# Patient Record
Sex: MALE | Race: WHITE | URBAN - METROPOLITAN AREA
[De-identification: names, ages, dates, MRNs, and addresses within clinical notes are randomized per-mention and may not be internally consistent; named-entity substitution may affect disease eponyms.]

---

## 2017-01-10 ENCOUNTER — DOCTOR'S OFFICE (OUTPATIENT)
Dept: URBAN - METROPOLITAN AREA CLINIC 125 | Facility: CLINIC | Age: 67
Setting detail: OPHTHALMOLOGY
End: 2017-01-10
Payer: COMMERCIAL

## 2017-01-10 DIAGNOSIS — H43.811: ICD-10-CM

## 2017-01-10 PROCEDURE — 92012 INTRM OPH EXAM EST PATIENT: CPT | Performed by: OPTOMETRIST

## 2017-01-10 ASSESSMENT — REFRACTION_CURRENTRX
OS_CYLINDER: -0.25
OD_OVR_VA: 20/
OS_ADD: +2.00
OD_SPHERE: -1.50
OS_SPHERE: -1.75
OS_OVR_VA: 20/
OD_OVR_VA: 20/
OD_AXIS: 042
OD_CYLINDER: -0.75
OD_ADD: +2.00
OS_OVR_VA: 20/
OS_OVR_VA: 20/
OS_AXIS: 175
OD_OVR_VA: 20/

## 2017-01-10 ASSESSMENT — REFRACTION_MANIFEST
OS_VA3: 20/
OD_VA2: 20/
OS_VA2: 20/
OD_VA1: 20/
OU_VA: 20/
OS_VA2: 20/
OS_VA1: 20/
OS_VA1: 20/
OD_VA3: 20/
OD_VA2: 20/
OD_VA3: 20/
OD_VA1: 20/
OS_VA3: 20/
OU_VA: 20/

## 2017-01-10 ASSESSMENT — REFRACTION_OUTSIDERX
OS_ADD: +2.50
OD_VA1: 20/30
OU_VA: 20/
OD_ADD: +2.50
OD_AXIS: 060
OD_CYLINDER: -0.75
OD_VA3: 20/
OD_SPHERE: -1.25
OS_CYLINDER: SPH
OS_VA1: 20/30
OS_SPHERE: -1.75
OS_VA3: 20/
OD_VA2: 20/20 OU
OS_VA2: 20/

## 2017-01-10 ASSESSMENT — REFRACTION_AUTOREFRACTION
OS_SPHERE: -2.00
OD_SPHERE: -1.00
OD_CYLINDER: -0.75
OS_CYLINDER: -0.50
OS_AXIS: 150
OD_AXIS: 046

## 2017-01-10 ASSESSMENT — CONFRONTATIONAL VISUAL FIELD TEST (CVF)
OS_FINDINGS: FULL
OD_FINDINGS: FULL

## 2017-01-10 ASSESSMENT — SPHEQUIV_DERIVED
OD_SPHEQUIV: -1.375
OS_SPHEQUIV: -2.25

## 2017-01-10 ASSESSMENT — VISUAL ACUITY
OD_BCVA: 20/20
OS_BCVA: 20/25

## 2017-01-11 ENCOUNTER — DOCTOR'S OFFICE (OUTPATIENT)
Dept: URBAN - METROPOLITAN AREA CLINIC 125 | Facility: CLINIC | Age: 67
Setting detail: OPHTHALMOLOGY
End: 2017-01-11
Payer: COMMERCIAL

## 2017-01-11 DIAGNOSIS — H04.121: ICD-10-CM

## 2017-01-11 DIAGNOSIS — H43.813: ICD-10-CM

## 2017-01-11 DIAGNOSIS — H43.812: ICD-10-CM

## 2017-01-11 DIAGNOSIS — H04.122: ICD-10-CM

## 2017-01-11 DIAGNOSIS — H25.13: ICD-10-CM

## 2017-01-11 PROCEDURE — 99214 OFFICE O/P EST MOD 30 MIN: CPT | Performed by: OPHTHALMOLOGY

## 2017-01-11 PROCEDURE — 92225 OPHTHALMOSCOPY EXTENDED INITIAL: CPT | Performed by: OPHTHALMOLOGY

## 2017-01-11 ASSESSMENT — REFRACTION_OUTSIDERX
OD_CYLINDER: -0.75
OD_VA1: 20/30
OS_VA1: 20/30
OU_VA: 20/
OS_ADD: +2.50
OS_CYLINDER: SPH
OD_ADD: +2.50
OD_VA3: 20/
OD_SPHERE: -1.25
OD_VA2: 20/20 OU
OS_SPHERE: -1.75
OD_AXIS: 060
OS_VA2: 20/
OS_VA3: 20/

## 2017-01-11 ASSESSMENT — REFRACTION_CURRENTRX
OS_OVR_VA: 20/
OD_CYLINDER: -0.75
OS_OVR_VA: 20/
OD_ADD: +2.00
OD_OVR_VA: 20/
OS_ADD: +2.00
OD_OVR_VA: 20/
OS_OVR_VA: 20/
OD_SPHERE: -1.50
OS_AXIS: 175
OS_CYLINDER: -0.25
OS_SPHERE: -1.75
OD_OVR_VA: 20/
OD_AXIS: 042

## 2017-01-11 ASSESSMENT — REFRACTION_MANIFEST
OD_VA2: 20/
OD_VA3: 20/
OS_VA1: 20/
OD_VA1: 20/
OS_VA2: 20/
OS_VA3: 20/
OS_VA1: 20/
OD_VA2: 20/
OD_VA3: 20/
OD_VA1: 20/
OU_VA: 20/
OS_VA2: 20/
OU_VA: 20/
OS_VA3: 20/

## 2017-01-11 ASSESSMENT — VISUAL ACUITY
OS_BCVA: 20/25-2
OD_BCVA: 20/25-1

## 2017-01-11 ASSESSMENT — REFRACTION_AUTOREFRACTION
OD_SPHERE: -1.00
OS_SPHERE: -2.00
OD_CYLINDER: -0.75
OS_CYLINDER: -0.50
OD_AXIS: 046
OS_AXIS: 150

## 2017-01-11 ASSESSMENT — CONFRONTATIONAL VISUAL FIELD TEST (CVF)
OD_FINDINGS: FULL
OS_FINDINGS: FULL

## 2017-01-11 ASSESSMENT — SPHEQUIV_DERIVED
OS_SPHEQUIV: -2.25
OD_SPHEQUIV: -1.375

## 2017-09-14 ENCOUNTER — DOCTOR'S OFFICE (OUTPATIENT)
Dept: URBAN - METROPOLITAN AREA CLINIC 125 | Facility: CLINIC | Age: 67
Setting detail: OPHTHALMOLOGY
End: 2017-09-14
Payer: COMMERCIAL

## 2017-09-14 DIAGNOSIS — H52.13: ICD-10-CM

## 2017-09-14 DIAGNOSIS — H52.4: ICD-10-CM

## 2017-09-14 PROBLEM — H43.811 POSTERIOR VITREOUS DETACHMENT; RIGHT EYE: Status: ACTIVE | Noted: 2017-01-11

## 2017-09-14 PROBLEM — H04.121 DRY EYE; RIGHT EYE, LEFT EYE: Status: ACTIVE | Noted: 2017-01-11

## 2017-09-14 PROBLEM — H04.122 DRY EYE; RIGHT EYE, LEFT EYE: Status: ACTIVE | Noted: 2017-01-11

## 2017-09-14 PROBLEM — H43.812 POSTERIOR VITREOUS DETACHMENT; RIGHT EYE: Status: ACTIVE | Noted: 2017-01-11

## 2017-09-14 PROBLEM — H25.13 CATARACT NUCLEAR SCLEROSIS AGE RELATED; BOTH EYES: Status: ACTIVE | Noted: 2017-01-11

## 2017-09-14 PROCEDURE — 92012 INTRM OPH EXAM EST PATIENT: CPT | Performed by: OPTOMETRIST

## 2017-09-14 PROCEDURE — 92015 DETERMINE REFRACTIVE STATE: CPT | Performed by: OPTOMETRIST

## 2017-09-14 PROCEDURE — 92310 CONTACT LENS FITTING OU: CPT | Performed by: OPTOMETRIST

## 2017-09-14 ASSESSMENT — KERATOMETRY
OD_K2POWER_DIOPTERS: 44.00
OD_K1POWER_DIOPTERS: 43.50
OS_K1POWER_DIOPTERS: 43.50
OD_AXISANGLE_DEGREES: 040
OS_K2POWER_DIOPTERS: 43.75
OS_AXISANGLE_DEGREES: 003

## 2017-09-14 ASSESSMENT — REFRACTION_AUTOREFRACTION
OD_CYLINDER: -0.50
OS_CYLINDER: -0.75
OS_SPHERE: -2.00
OD_SPHERE: -1.25
OD_AXIS: 027
OS_AXIS: 007

## 2017-09-14 ASSESSMENT — REFRACTION_MANIFEST
OD_VA1: 20/
OS_VA3: 20/
OD_VA2: 20/
OS_VA3: 20/
OS_VA2: 20/
OS_VA1: 20/
OD_VA3: 20/
OS_VA1: 20/
OD_VA3: 20/
OD_VA2: 20/
OU_VA: 20/
OU_VA: 20/
OD_VA1: 20/
OS_VA2: 20/

## 2017-09-14 ASSESSMENT — REFRACTION_OUTSIDERX
OD_VA3: 20/
OD_SPHERE: -1.25
OU_VA: 20/
OS_ADD: +2.50
OS_SPHERE: -1.75
OS_VA1: 20/30
OS_VA3: 20/
OS_CYLINDER: SPH
OS_VA2: 20/
OD_ADD: +2.50
OD_CYLINDER: SPH
OD_VA2: 20/20 OU
OD_VA1: 20/30

## 2017-09-14 ASSESSMENT — AXIALLENGTH_DERIVED
OS_AL: 24.5054
OD_AL: 24.095

## 2017-09-14 ASSESSMENT — SPHEQUIV_DERIVED
OD_SPHEQUIV: -1.5
OS_SPHEQUIV: -2.375

## 2017-09-14 ASSESSMENT — REFRACTION_CURRENTRX
OD_VPRISM_DIRECTION: PROGS
OD_SPHERE: -1.75
OD_OVR_VA: 20/
OS_VPRISM_DIRECTION: PROGS
OS_ADD: +2.00
OD_AXIS: 033
OS_OVR_VA: 20/
OS_CYLINDER: -0.25
OD_ADD: +2.00
OD_OVR_VA: 20/
OD_OVR_VA: 20/
OS_AXIS: 172
OD_CYLINDER: -0.75
OS_SPHERE: -1.75

## 2017-09-14 ASSESSMENT — VISUAL ACUITY
OD_BCVA: 20/40
OS_BCVA: 20/40+1

## 2017-09-20 ENCOUNTER — OPTICAL OFFICE (OUTPATIENT)
Dept: URBAN - METROPOLITAN AREA CLINIC 134 | Facility: CLINIC | Age: 67
Setting detail: OPHTHALMOLOGY
End: 2017-09-20
Payer: COMMERCIAL

## 2017-09-20 DIAGNOSIS — H52.13: ICD-10-CM

## 2017-09-20 PROCEDURE — S0500 DISPOS CONT LENS: HCPCS | Performed by: OPTOMETRIST

## 2024-09-16 ENCOUNTER — TRANSCRIBE ORDERS (OUTPATIENT)
Dept: REGISTRATION | Facility: HOSPITAL | Age: 74
End: 2024-09-16

## 2024-09-16 ENCOUNTER — APPOINTMENT (OUTPATIENT)
Dept: LAB | Facility: HOSPITAL | Age: 74
End: 2024-09-16
Attending: ORTHOPAEDIC SURGERY
Payer: MEDICARE

## 2024-09-16 DIAGNOSIS — Z01.812 ENCOUNTER FOR PREPROCEDURAL LABORATORY EXAMINATION: Primary | ICD-10-CM

## 2024-09-16 DIAGNOSIS — Z01.812 ENCOUNTER FOR PREPROCEDURAL LABORATORY EXAMINATION: ICD-10-CM

## 2024-09-16 LAB
ABO + RH BLD: NORMAL
ANION GAP SERPL CALC-SCNC: 7 MEQ/L (ref 3–15)
APTT PPP: 25 SEC (ref 23–35)
BASOPHILS # BLD: 0.03 K/UL (ref 0.01–0.1)
BASOPHILS NFR BLD: 0.6 %
BLD GP AB SCN SERPL QL: NEGATIVE
BLOOD BANK CMNT PATIENT-IMP: NORMAL
BUN SERPL-MCNC: 29 MG/DL (ref 7–25)
CALCIUM SERPL-MCNC: 9.8 MG/DL (ref 8.6–10.3)
CHLORIDE SERPL-SCNC: 105 MEQ/L (ref 98–107)
CO2 SERPL-SCNC: 28 MEQ/L (ref 21–31)
CREAT SERPL-MCNC: 1.8 MG/DL (ref 0.7–1.3)
D AG BLD QL: POSITIVE
DIFFERENTIAL METHOD BLD: ABNORMAL
EGFRCR SERPLBLD CKD-EPI 2021: 38.8 ML/MIN/1.73M*2
EOSINOPHIL # BLD: 0.19 K/UL (ref 0.04–0.54)
EOSINOPHIL NFR BLD: 3.5 %
ERYTHROCYTE [DISTWIDTH] IN BLOOD BY AUTOMATED COUNT: 12.2 % (ref 11.6–14.4)
EST. AVERAGE GLUCOSE BLD GHB EST-MCNC: 128 MG/DL
GLUCOSE SERPL-MCNC: 185 MG/DL (ref 70–99)
HBA1C MFR BLD: 6.1 %
HCT VFR BLD AUTO: 34.4 % (ref 40.1–51)
HGB BLD-MCNC: 11.7 G/DL (ref 13.7–17.5)
IMM GRANULOCYTES # BLD AUTO: 0.01 K/UL (ref 0–0.08)
IMM GRANULOCYTES NFR BLD AUTO: 0.2 %
INR PPP: 1.2
LABORATORY COMMENT REPORT: NORMAL
LYMPHOCYTES # BLD: 0.95 K/UL (ref 1.2–3.5)
LYMPHOCYTES NFR BLD: 17.5 %
MCH RBC QN AUTO: 30.9 PG (ref 28–33.2)
MCHC RBC AUTO-ENTMCNC: 34 G/DL (ref 32.2–36.5)
MCV RBC AUTO: 90.8 FL (ref 83–98)
MONOCYTES # BLD: 0.43 K/UL (ref 0.3–1)
MONOCYTES NFR BLD: 7.9 %
NEUTROPHILS # BLD: 3.83 K/UL (ref 1.7–7)
NEUTS SEG NFR BLD: 70.3 %
NRBC BLD-RTO: 0 %
PDW BLD AUTO: 9.7 FL (ref 9.4–12.4)
PLAT MORPH BLD: NORMAL
PLATELET # BLD AUTO: 143 K/UL (ref 150–350)
PLATELET # BLD EST: ABNORMAL 10*3/UL
POTASSIUM SERPL-SCNC: 4.3 MEQ/L (ref 3.5–5.1)
PROTHROMBIN TIME: 14.9 SEC (ref 12.2–14.5)
RBC # BLD AUTO: 3.79 M/UL (ref 4.5–5.8)
RBC MORPH BLD: NORMAL
SODIUM SERPL-SCNC: 140 MEQ/L (ref 136–145)
SPECIMEN EXP DATE BLD: NORMAL
WBC # BLD AUTO: 5.44 K/UL (ref 3.8–10.5)

## 2024-09-16 PROCEDURE — 85025 COMPLETE CBC W/AUTO DIFF WBC: CPT

## 2024-09-16 PROCEDURE — 86850 RBC ANTIBODY SCREEN: CPT

## 2024-09-16 PROCEDURE — 86901 BLOOD TYPING SEROLOGIC RH(D): CPT

## 2024-09-16 PROCEDURE — 85610 PROTHROMBIN TIME: CPT

## 2024-09-16 PROCEDURE — 36415 COLL VENOUS BLD VENIPUNCTURE: CPT

## 2024-09-16 PROCEDURE — 80048 BASIC METABOLIC PNL TOTAL CA: CPT

## 2024-09-16 PROCEDURE — 85730 THROMBOPLASTIN TIME PARTIAL: CPT | Mod: GZ

## 2024-09-16 PROCEDURE — 83036 HEMOGLOBIN GLYCOSYLATED A1C: CPT | Mod: GZ

## 2024-10-02 ENCOUNTER — PRE-ADMISSION TESTING (OUTPATIENT)
Dept: PREADMISSION TESTING | Age: 74
End: 2024-10-02
Payer: MEDICARE

## 2024-10-02 VITALS — HEIGHT: 73 IN | WEIGHT: 214 LBS | BODY MASS INDEX: 28.36 KG/M2

## 2024-10-02 RX ORDER — ISOSORBIDE MONONITRATE 30 MG/1
30 TABLET, EXTENDED RELEASE ORAL DAILY
COMMUNITY

## 2024-10-02 RX ORDER — MIRTAZAPINE 15 MG/1
15 TABLET, FILM COATED ORAL NIGHTLY
COMMUNITY

## 2024-10-02 RX ORDER — CARVEDILOL 3.12 MG/1
3.12 TABLET ORAL 2 TIMES DAILY WITH MEALS
COMMUNITY

## 2024-10-02 RX ORDER — ATORVASTATIN CALCIUM 40 MG/1
40 TABLET, FILM COATED ORAL DAILY
COMMUNITY

## 2024-10-02 RX ORDER — TAMSULOSIN HYDROCHLORIDE 0.4 MG/1
0.4 CAPSULE ORAL NIGHTLY
COMMUNITY

## 2024-10-02 RX ORDER — HYDRALAZINE HYDROCHLORIDE 50 MG/1
75 TABLET, FILM COATED ORAL 3 TIMES DAILY
COMMUNITY

## 2024-10-02 RX ORDER — ASPIRIN 81 MG/1
81 TABLET ORAL DAILY
COMMUNITY

## 2024-10-02 RX ORDER — LOSARTAN POTASSIUM 50 MG/1
50 TABLET ORAL DAILY
COMMUNITY

## 2024-10-02 RX ORDER — AMLODIPINE BESYLATE 5 MG/1
5 TABLET ORAL DAILY
COMMUNITY

## 2024-10-02 NOTE — PRE-PROCEDURE INSTRUCTIONS
[address] [address]    1.       You will be called between 1pm-5pm one business day before your procedure to tell you where and when to report. If you do not receive a phone call by 6pm, please call the Operating Room at [phone].    2.        Please report to Surgery Registration on the day of your procedure. You can park in the east garage, enter the front doors of the new Pavilion, and take the [place] elevators to the second floor. Follow signs to Surgery Registration.       3. Please follow the following fasting guidelines: NPO after midnight as per Dr. Lucas's instructions     No solid food EIGHT HOURS prior to arrival time on day of surgery.  6 ounces of clear liquids, meaning water or PLAIN black coffee WITHOUT any milk, cream, sugar, or sweetener are permitted up to TWO HOURS prior to arrival at the hospital.    4. Please take ONLY the following medications with a sip of water on the morning of your procedure: (populate names and/or NONE)  coreg, apresoline, isosorbide             No NSAIDs, Supplements, Vitamins from 10/2/24              May take Tylenol if needed               Jardiance 10/3/24       5. Other Instructions: You may brush your teeth the morning of the procedure. Rinse and spit, do not swallow.  Bring a list of your medications with dosages.  Use surgical wash as directed. Surgical Scrub the night before and the morning of the procedure    6. If you develop a cold, cough, fever, rash, or other symptom prior to the day of the procedure, please report it to your physician immediately.    7. If you need to cancel the procedure for any reason, please contact your physician.    8. Make arrangements to have safe transportation home accompanied by a responsible adult. If you have not arranged safe transportation home, your surgery will be cancelled. Safe transportation may include private vehicle, ride-share service, taxi and public transportation when  accompanied by a responsible adult who will assist you home. A responsible adult is someone known to you and does not include the taxi, ride-share or public transit drive transporting you.    9.  If it is medically necessary for you to have a longer stay, you will be informed as soon as the decision is made.    10. Only bring essential items to the hospital.  Do not wear or bring anything of value to the hospital including jewelry of any kind, money, or wallet. Do not wear make-up or contact lenses.  DO NOT BRING MEDICATIONS FROM HOME unless instructed to do so. DO bring your hearing aids, glasses, and a case    11. No lotion, creams, powders, or oils on skin the morning of procedure     12. Dress in comfortable clothes.    13.  If instructed, please bring a copy of your Advanced Directive (Living Will/Durable Power of ) on the day of your procedure.     14. Ensuring your safety at all times is a very important part of our [facility] Culture of Safety. After having surgery and sedation, you are at risk for falling and balance issues. Although you may feel awake, the effects of the medication can last up to 24 hours after anesthesia. If you need to use the bathroom during your recovery period, nursing staff will escort you there and stay with you to ensure your safety.    15. Refrain from drinking alcohol and smoking cigarettes for 24 hours prior to surgery.    16. Shower with antibacterial soap (Dial) the night before and morning of your procedure.  If your procedure indicates the need for CHG antiseptic wash (Bactoshield or Hibiclens), please use this instead and follow instructions as discussed at the time of your Pre-Admission Testing phone interview or visit.    Above instructions reviewed with patient and patient acknowledges understanding.    Form explained by: Dana Pedersen RN

## 2024-10-07 ENCOUNTER — ANESTHESIA EVENT (OUTPATIENT)
Dept: OPERATING ROOM | Facility: HOSPITAL | Age: 74
Setting detail: SURGERY ADMIT
DRG: 472 | End: 2024-10-07
Payer: MEDICARE

## 2024-10-07 ENCOUNTER — APPOINTMENT (INPATIENT)
Dept: RADIOLOGY | Facility: HOSPITAL | Age: 74
DRG: 472 | End: 2024-10-07
Attending: NURSE PRACTITIONER
Payer: MEDICARE

## 2024-10-07 ENCOUNTER — APPOINTMENT (OUTPATIENT)
Dept: RADIOLOGY | Facility: HOSPITAL | Age: 74
Setting detail: SURGERY ADMIT
DRG: 472 | End: 2024-10-07
Attending: ORTHOPAEDIC SURGERY
Payer: MEDICARE

## 2024-10-07 ENCOUNTER — HOSPITAL ENCOUNTER (INPATIENT)
Facility: HOSPITAL | Age: 74
LOS: 4 days | Discharge: HOME | DRG: 472 | End: 2024-10-11
Attending: ORTHOPAEDIC SURGERY | Admitting: ORTHOPAEDIC SURGERY
Payer: MEDICARE

## 2024-10-07 DIAGNOSIS — I48.0 PAROXYSMAL ATRIAL FIBRILLATION (CMS/HCC): Primary | ICD-10-CM

## 2024-10-07 DIAGNOSIS — N17.9 AKI (ACUTE KIDNEY INJURY) (CMS/HCC): ICD-10-CM

## 2024-10-07 DIAGNOSIS — M79.2 CERVICAL SPINE ARTHRITIS WITH NERVE PAIN: ICD-10-CM

## 2024-10-07 DIAGNOSIS — M47.812 CERVICAL SPINE ARTHRITIS WITH NERVE PAIN: ICD-10-CM

## 2024-10-07 PROBLEM — I25.10 CAD (CORONARY ARTERY DISEASE): Status: ACTIVE | Noted: 2024-10-07

## 2024-10-07 PROBLEM — Z86.79 HISTORY OF ISCHEMIC CARDIOMYOPATHY: Status: ACTIVE | Noted: 2024-10-07

## 2024-10-07 PROBLEM — I10 ESSENTIAL HYPERTENSION: Status: ACTIVE | Noted: 2024-10-07

## 2024-10-07 PROBLEM — G62.9 NEUROPATHY: Status: ACTIVE | Noted: 2024-10-07

## 2024-10-07 PROBLEM — E11.9 DIABETES MELLITUS TYPE II, NON INSULIN DEPENDENT (CMS/HCC): Status: ACTIVE | Noted: 2024-10-07

## 2024-10-07 PROBLEM — I71.40 AAA (ABDOMINAL AORTIC ANEURYSM) (CMS/HCC): Status: ACTIVE | Noted: 2024-10-07

## 2024-10-07 LAB
GLUCOSE BLD-MCNC: 140 MG/DL (ref 70–99)
GLUCOSE BLD-MCNC: 155 MG/DL (ref 70–99)
POCT TEST: ABNORMAL
POCT TEST: ABNORMAL

## 2024-10-07 PROCEDURE — 25000000 HC PHARMACY GENERAL: Performed by: NURSE PRACTITIONER

## 2024-10-07 PROCEDURE — 63600000 HC DRUGS/DETAIL CODE: Mod: JZ | Performed by: STUDENT IN AN ORGANIZED HEALTH CARE EDUCATION/TRAINING PROGRAM

## 2024-10-07 PROCEDURE — 63600000 HC DRUGS/DETAIL CODE: Mod: JZ

## 2024-10-07 PROCEDURE — C1713 ANCHOR/SCREW BN/BN,TIS/BN: HCPCS | Performed by: ORTHOPAEDIC SURGERY

## 2024-10-07 PROCEDURE — 71000001 HC PACU PHASE 1 INITIAL 30MIN: Performed by: ORTHOPAEDIC SURGERY

## 2024-10-07 PROCEDURE — 37000001 HC ANESTHESIA GENERAL: Performed by: ORTHOPAEDIC SURGERY

## 2024-10-07 PROCEDURE — 63600000 HC DRUGS/DETAIL CODE: Mod: JZ | Performed by: NURSE PRACTITIONER

## 2024-10-07 PROCEDURE — 00NW0ZZ RELEASE CERVICAL SPINAL CORD, OPEN APPROACH: ICD-10-PCS | Performed by: ORTHOPAEDIC SURGERY

## 2024-10-07 PROCEDURE — 20600000 HC ROOM AND CARE INTERMEDIATE/TELEMETRY

## 2024-10-07 PROCEDURE — 63700000 HC SELF-ADMINISTRABLE DRUG: Performed by: NURSE PRACTITIONER

## 2024-10-07 PROCEDURE — 72020 X-RAY EXAM OF SPINE 1 VIEW: CPT

## 2024-10-07 PROCEDURE — 99232 SBSQ HOSP IP/OBS MODERATE 35: CPT | Mod: FS | Performed by: INTERNAL MEDICINE

## 2024-10-07 PROCEDURE — 86891 AUTOLOGOUS BLOOD OP SALVAGE: CPT

## 2024-10-07 PROCEDURE — 25800000 HC PHARMACY IV SOLUTIONS: Performed by: NURSE PRACTITIONER

## 2024-10-07 PROCEDURE — 25000000 HC PHARMACY GENERAL: Mod: JW | Performed by: NURSE ANESTHETIST, CERTIFIED REGISTERED

## 2024-10-07 PROCEDURE — 27200000 HC STERILE SUPPLY: Performed by: ORTHOPAEDIC SURGERY

## 2024-10-07 PROCEDURE — 63600000 HC DRUGS/DETAIL CODE: Mod: JW | Performed by: NURSE PRACTITIONER

## 2024-10-07 PROCEDURE — 36000004 HC OR LEVEL 4 INITIAL 30MIN: Performed by: ORTHOPAEDIC SURGERY

## 2024-10-07 PROCEDURE — 27800000 HC SUPPLY/IMPLANTS: Performed by: ORTHOPAEDIC SURGERY

## 2024-10-07 PROCEDURE — 71000011 HC PACU PHASE 1 EA ADDL MIN: Performed by: ORTHOPAEDIC SURGERY

## 2024-10-07 PROCEDURE — 36000014 HC OR LEVEL 4 EA ADDL MIN: Performed by: ORTHOPAEDIC SURGERY

## 2024-10-07 PROCEDURE — 25000000 HC PHARMACY GENERAL: Performed by: ORTHOPAEDIC SURGERY

## 2024-10-07 PROCEDURE — 0RG2071 FUSION OF 2 OR MORE CERVICAL VERTEBRAL JOINTS WITH AUTOLOGOUS TISSUE SUBSTITUTE, POSTERIOR APPROACH, POSTERIOR COLUMN, OPEN APPROACH: ICD-10-PCS | Performed by: ORTHOPAEDIC SURGERY

## 2024-10-07 PROCEDURE — 4A10X4G MONITORING OF CENTRAL NERVOUS ELECTRICAL ACTIVITY, INTRAOPERATIVE, EXTERNAL APPROACH: ICD-10-PCS | Performed by: ORTHOPAEDIC SURGERY

## 2024-10-07 PROCEDURE — 63600000 HC DRUGS/DETAIL CODE: Mod: JZ | Performed by: ORTHOPAEDIC SURGERY

## 2024-10-07 PROCEDURE — 63600000 HC DRUGS/DETAIL CODE: Mod: JW | Performed by: NURSE ANESTHETIST, CERTIFIED REGISTERED

## 2024-10-07 DEVICE — IMPLANTABLE DEVICE: Type: IMPLANTABLE DEVICE | Status: FUNCTIONAL

## 2024-10-07 DEVICE — 4.0 PLY SCREW 3.5X12: Type: IMPLANTABLE DEVICE | Status: FUNCTIONAL

## 2024-10-07 DEVICE — 4.0 PLY SCREW 3.5X20: Type: IMPLANTABLE DEVICE | Status: FUNCTIONAL

## 2024-10-07 DEVICE — SCREW 4.0 PLY 3.5X10: Type: IMPLANTABLE DEVICE | Status: FUNCTIONAL

## 2024-10-07 DEVICE — 3 DEMIN STRIP 50 X 10CM: Type: IMPLANTABLE DEVICE | Status: FUNCTIONAL

## 2024-10-07 DEVICE — IMPLANTABLE DEVICE: Type: IMPLANTABLE DEVICE | Site: NECK | Status: FUNCTIONAL

## 2024-10-07 DEVICE — SET SCREW: Type: IMPLANTABLE DEVICE | Status: FUNCTIONAL

## 2024-10-07 RX ORDER — HYDROMORPHONE HYDROCHLORIDE 1 MG/ML
INJECTION, SOLUTION INTRAMUSCULAR; INTRAVENOUS; SUBCUTANEOUS
Status: COMPLETED
Start: 2024-10-07 | End: 2024-10-07

## 2024-10-07 RX ORDER — TAMSULOSIN HYDROCHLORIDE 0.4 MG/1
0.4 CAPSULE ORAL NIGHTLY
Status: DISCONTINUED | OUTPATIENT
Start: 2024-10-07 | End: 2024-10-11 | Stop reason: HOSPADM

## 2024-10-07 RX ORDER — HYDRALAZINE HYDROCHLORIDE 20 MG/ML
5 INJECTION INTRAMUSCULAR; INTRAVENOUS
Status: DISCONTINUED | OUTPATIENT
Start: 2024-10-07 | End: 2024-10-07 | Stop reason: HOSPADM

## 2024-10-07 RX ORDER — MORPHINE SULFATE 2 MG/ML
1 INJECTION, SOLUTION INTRAMUSCULAR; INTRAVENOUS EVERY 4 HOURS PRN
Status: DISPENSED | OUTPATIENT
Start: 2024-10-07 | End: 2024-10-08

## 2024-10-07 RX ORDER — FENTANYL CITRATE 50 UG/ML
50 INJECTION, SOLUTION INTRAMUSCULAR; INTRAVENOUS EVERY 5 MIN PRN
Status: DISCONTINUED | OUTPATIENT
Start: 2024-10-07 | End: 2024-10-07 | Stop reason: HOSPADM

## 2024-10-07 RX ORDER — OXYCODONE HYDROCHLORIDE 5 MG/1
5-10 TABLET ORAL EVERY 4 HOURS PRN
Status: DISCONTINUED | OUTPATIENT
Start: 2024-10-07 | End: 2024-10-11 | Stop reason: HOSPADM

## 2024-10-07 RX ORDER — ISOSORBIDE MONONITRATE 30 MG/1
30 TABLET, EXTENDED RELEASE ORAL DAILY
Status: DISCONTINUED | OUTPATIENT
Start: 2024-10-08 | End: 2024-10-11 | Stop reason: HOSPADM

## 2024-10-07 RX ORDER — ONDANSETRON HYDROCHLORIDE 2 MG/ML
4 INJECTION, SOLUTION INTRAVENOUS
Status: DISCONTINUED | OUTPATIENT
Start: 2024-10-07 | End: 2024-10-07 | Stop reason: HOSPADM

## 2024-10-07 RX ORDER — POLYETHYLENE GLYCOL 3350 17 G/17G
17 POWDER, FOR SOLUTION ORAL DAILY
Status: DISCONTINUED | OUTPATIENT
Start: 2024-10-07 | End: 2024-10-11 | Stop reason: HOSPADM

## 2024-10-07 RX ORDER — HYDRALAZINE HYDROCHLORIDE 20 MG/ML
INJECTION INTRAMUSCULAR; INTRAVENOUS
Status: DISPENSED
Start: 2024-10-07 | End: 2024-10-08

## 2024-10-07 RX ORDER — OXYCODONE HYDROCHLORIDE 5 MG/1
5 TABLET ORAL ONCE AS NEEDED
Status: DISCONTINUED | OUTPATIENT
Start: 2024-10-07 | End: 2024-10-07 | Stop reason: HOSPADM

## 2024-10-07 RX ORDER — BISACODYL 10 MG/1
10 SUPPOSITORY RECTAL NIGHTLY
Status: DISCONTINUED | OUTPATIENT
Start: 2024-10-08 | End: 2024-10-11 | Stop reason: HOSPADM

## 2024-10-07 RX ORDER — IBUPROFEN 200 MG
16-32 TABLET ORAL AS NEEDED
Status: DISCONTINUED | OUTPATIENT
Start: 2024-10-07 | End: 2024-10-11 | Stop reason: HOSPADM

## 2024-10-07 RX ORDER — ONDANSETRON HYDROCHLORIDE 2 MG/ML
4 INJECTION, SOLUTION INTRAVENOUS EVERY 6 HOURS PRN
Status: DISCONTINUED | OUTPATIENT
Start: 2024-10-07 | End: 2024-10-11 | Stop reason: HOSPADM

## 2024-10-07 RX ORDER — SODIUM CHLORIDE, SODIUM LACTATE, POTASSIUM CHLORIDE, CALCIUM CHLORIDE 600; 310; 30; 20 MG/100ML; MG/100ML; MG/100ML; MG/100ML
INJECTION, SOLUTION INTRAVENOUS CONTINUOUS
Status: ACTIVE | OUTPATIENT
Start: 2024-10-07 | End: 2024-10-07

## 2024-10-07 RX ORDER — FENTANYL CITRATE 50 UG/ML
INJECTION, SOLUTION INTRAMUSCULAR; INTRAVENOUS
Status: COMPLETED
Start: 2024-10-07 | End: 2024-10-07

## 2024-10-07 RX ORDER — PHENYLEPHRINE HYDROCHLORIDE 10 MG/ML
INJECTION INTRAVENOUS CONTINUOUS PRN
Status: DISCONTINUED | OUTPATIENT
Start: 2024-10-07 | End: 2024-10-07 | Stop reason: SURG

## 2024-10-07 RX ORDER — DIAZEPAM 5 MG/1
5 TABLET ORAL EVERY 8 HOURS PRN
Status: DISCONTINUED | OUTPATIENT
Start: 2024-10-07 | End: 2024-10-11 | Stop reason: HOSPADM

## 2024-10-07 RX ORDER — DEXAMETHASONE SODIUM PHOSPHATE 4 MG/ML
10 INJECTION, SOLUTION INTRA-ARTICULAR; INTRALESIONAL; INTRAMUSCULAR; INTRAVENOUS; SOFT TISSUE
Status: COMPLETED | OUTPATIENT
Start: 2024-10-07 | End: 2024-10-08

## 2024-10-07 RX ORDER — SODIUM CHLORIDE 9 MG/ML
INJECTION, SOLUTION INTRAVENOUS CONTINUOUS
Status: ACTIVE | OUTPATIENT
Start: 2024-10-07 | End: 2024-10-08

## 2024-10-07 RX ORDER — HYDROMORPHONE HYDROCHLORIDE 1 MG/ML
0.5 INJECTION, SOLUTION INTRAMUSCULAR; INTRAVENOUS; SUBCUTANEOUS
Status: DISCONTINUED | OUTPATIENT
Start: 2024-10-07 | End: 2024-10-07 | Stop reason: HOSPADM

## 2024-10-07 RX ORDER — DIAZEPAM 5 MG/1
TABLET ORAL
Status: DISPENSED
Start: 2024-10-07 | End: 2024-10-08

## 2024-10-07 RX ORDER — AMOXICILLIN 250 MG
1 CAPSULE ORAL 2 TIMES DAILY
Status: DISCONTINUED | OUTPATIENT
Start: 2024-10-07 | End: 2024-10-11 | Stop reason: HOSPADM

## 2024-10-07 RX ORDER — AMLODIPINE BESYLATE 5 MG/1
5 TABLET ORAL NIGHTLY
Status: DISCONTINUED | OUTPATIENT
Start: 2024-10-07 | End: 2024-10-09

## 2024-10-07 RX ORDER — LOSARTAN POTASSIUM 50 MG/1
50 TABLET ORAL DAILY
Status: DISCONTINUED | OUTPATIENT
Start: 2024-10-08 | End: 2024-10-11 | Stop reason: HOSPADM

## 2024-10-07 RX ORDER — FENTANYL CITRATE 50 UG/ML
INJECTION, SOLUTION INTRAMUSCULAR; INTRAVENOUS AS NEEDED
Status: DISCONTINUED | OUTPATIENT
Start: 2024-10-07 | End: 2024-10-07 | Stop reason: SURG

## 2024-10-07 RX ORDER — ALUMINUM HYDROXIDE, MAGNESIUM HYDROXIDE, AND SIMETHICONE 1200; 120; 1200 MG/30ML; MG/30ML; MG/30ML
30 SUSPENSION ORAL EVERY 4 HOURS PRN
Status: DISCONTINUED | OUTPATIENT
Start: 2024-10-07 | End: 2024-10-11 | Stop reason: HOSPADM

## 2024-10-07 RX ORDER — MEPERIDINE HYDROCHLORIDE 25 MG/ML
12.5 INJECTION INTRAMUSCULAR; INTRAVENOUS; SUBCUTANEOUS EVERY 10 MIN PRN
Status: DISCONTINUED | OUTPATIENT
Start: 2024-10-07 | End: 2024-10-07 | Stop reason: HOSPADM

## 2024-10-07 RX ORDER — ASPIRIN 81 MG/1
81 TABLET ORAL DAILY
Status: DISCONTINUED | OUTPATIENT
Start: 2024-10-08 | End: 2024-10-11 | Stop reason: HOSPADM

## 2024-10-07 RX ORDER — PANTOPRAZOLE SODIUM 40 MG/1
40 TABLET, DELAYED RELEASE ORAL DAILY
Status: DISCONTINUED | OUTPATIENT
Start: 2024-10-07 | End: 2024-10-11 | Stop reason: HOSPADM

## 2024-10-07 RX ORDER — ATORVASTATIN CALCIUM 40 MG/1
40 TABLET, FILM COATED ORAL
Status: DISCONTINUED | OUTPATIENT
Start: 2024-10-07 | End: 2024-10-11 | Stop reason: HOSPADM

## 2024-10-07 RX ORDER — HYDROMORPHONE HYDROCHLORIDE 1 MG/ML
INJECTION, SOLUTION INTRAMUSCULAR; INTRAVENOUS; SUBCUTANEOUS
Status: DISPENSED
Start: 2024-10-07 | End: 2024-10-08

## 2024-10-07 RX ORDER — HYDRALAZINE HYDROCHLORIDE 25 MG/1
75 TABLET, FILM COATED ORAL
Status: DISCONTINUED | OUTPATIENT
Start: 2024-10-07 | End: 2024-10-11 | Stop reason: HOSPADM

## 2024-10-07 RX ORDER — HYDRALAZINE HYDROCHLORIDE 25 MG/1
75 TABLET, FILM COATED ORAL ONCE
Status: COMPLETED | OUTPATIENT
Start: 2024-10-07 | End: 2024-10-07

## 2024-10-07 RX ORDER — MIDAZOLAM HYDROCHLORIDE 2 MG/2ML
1 INJECTION, SOLUTION INTRAMUSCULAR; INTRAVENOUS ONCE AS NEEDED
Status: DISCONTINUED | OUTPATIENT
Start: 2024-10-07 | End: 2024-10-07 | Stop reason: HOSPADM

## 2024-10-07 RX ORDER — LABETALOL HYDROCHLORIDE 5 MG/ML
5 INJECTION, SOLUTION INTRAVENOUS AS NEEDED
Status: DISCONTINUED | OUTPATIENT
Start: 2024-10-07 | End: 2024-10-07 | Stop reason: HOSPADM

## 2024-10-07 RX ORDER — EPHEDRINE SULFATE 50 MG/ML
INJECTION, SOLUTION INTRAVENOUS AS NEEDED
Status: DISCONTINUED | OUTPATIENT
Start: 2024-10-07 | End: 2024-10-07 | Stop reason: SURG

## 2024-10-07 RX ORDER — DEXTROSE 40 %
15-30 GEL (GRAM) ORAL AS NEEDED
Status: DISCONTINUED | OUTPATIENT
Start: 2024-10-07 | End: 2024-10-11 | Stop reason: HOSPADM

## 2024-10-07 RX ORDER — DEXTROSE 50 % IN WATER (D50W) INTRAVENOUS SYRINGE
25 AS NEEDED
Status: DISCONTINUED | OUTPATIENT
Start: 2024-10-07 | End: 2024-10-11 | Stop reason: HOSPADM

## 2024-10-07 RX ORDER — GLYCOPYRROLATE 0.6MG/3ML
SYRINGE (ML) INTRAVENOUS AS NEEDED
Status: DISCONTINUED | OUTPATIENT
Start: 2024-10-07 | End: 2024-10-07 | Stop reason: SURG

## 2024-10-07 RX ORDER — ACETAMINOPHEN 325 MG/1
975 TABLET ORAL
Status: DISCONTINUED | OUTPATIENT
Start: 2024-10-07 | End: 2024-10-11 | Stop reason: HOSPADM

## 2024-10-07 RX ORDER — ATROPINE SULFATE 0.4 MG/ML
0.2 INJECTION, SOLUTION ENDOTRACHEAL; INTRAMEDULLARY; INTRAMUSCULAR; INTRAVENOUS; SUBCUTANEOUS EVERY 5 MIN PRN
Status: DISCONTINUED | OUTPATIENT
Start: 2024-10-07 | End: 2024-10-07 | Stop reason: HOSPADM

## 2024-10-07 RX ORDER — PROPOFOL 10 MG/ML
INJECTION, EMULSION INTRAVENOUS AS NEEDED
Status: DISCONTINUED | OUTPATIENT
Start: 2024-10-07 | End: 2024-10-07 | Stop reason: SURG

## 2024-10-07 RX ORDER — HYDROMORPHONE HYDROCHLORIDE 1 MG/ML
INJECTION, SOLUTION INTRAMUSCULAR; INTRAVENOUS; SUBCUTANEOUS AS NEEDED
Status: DISCONTINUED | OUTPATIENT
Start: 2024-10-07 | End: 2024-10-07 | Stop reason: SURG

## 2024-10-07 RX ORDER — MIDAZOLAM HYDROCHLORIDE 2 MG/2ML
INJECTION, SOLUTION INTRAMUSCULAR; INTRAVENOUS AS NEEDED
Status: DISCONTINUED | OUTPATIENT
Start: 2024-10-07 | End: 2024-10-07 | Stop reason: SURG

## 2024-10-07 RX ORDER — VANCOMYCIN HYDROCHLORIDE 1 G/20ML
INJECTION, POWDER, LYOPHILIZED, FOR SOLUTION INTRAVENOUS
Status: DISCONTINUED | OUTPATIENT
Start: 2024-10-07 | End: 2024-10-07 | Stop reason: HOSPADM

## 2024-10-07 RX ORDER — LIDOCAINE HCL/PF 100 MG/5ML
SYRINGE (ML) INTRAVENOUS AS NEEDED
Status: DISCONTINUED | OUTPATIENT
Start: 2024-10-07 | End: 2024-10-07 | Stop reason: SURG

## 2024-10-07 RX ORDER — SODIUM CHLORIDE 0.9 G/100ML
INJECTION, SOLUTION IRRIGATION
Status: DISCONTINUED | OUTPATIENT
Start: 2024-10-07 | End: 2024-10-07 | Stop reason: HOSPADM

## 2024-10-07 RX ORDER — DIPHENHYDRAMINE HCL 50 MG/ML
25 VIAL (ML) INJECTION EVERY 6 HOURS PRN
Status: DISCONTINUED | OUTPATIENT
Start: 2024-10-07 | End: 2024-10-11 | Stop reason: HOSPADM

## 2024-10-07 RX ADMIN — SODIUM CHLORIDE: 9 INJECTION, SOLUTION INTRAVENOUS at 18:51

## 2024-10-07 RX ADMIN — MIDAZOLAM HYDROCHLORIDE 1 MG: 1 INJECTION, SOLUTION INTRAMUSCULAR; INTRAVENOUS at 12:32

## 2024-10-07 RX ADMIN — SENNOSIDES AND DOCUSATE SODIUM 1 TABLET: 8.6; 5 TABLET ORAL at 20:11

## 2024-10-07 RX ADMIN — HYDROMORPHONE HYDROCHLORIDE 0.5 MG: 1 INJECTION, SOLUTION INTRAMUSCULAR; INTRAVENOUS; SUBCUTANEOUS at 16:43

## 2024-10-07 RX ADMIN — CEFAZOLIN 2 G: 2 INJECTION, POWDER, FOR SOLUTION INTRAMUSCULAR; INTRAVENOUS at 12:50

## 2024-10-07 RX ADMIN — DIAZEPAM 5 MG: 5 TABLET ORAL at 16:56

## 2024-10-07 RX ADMIN — GLYCOPYRROLATE 0.2 MG: 0.2 INJECTION INTRAMUSCULAR; INTRAVENOUS at 12:39

## 2024-10-07 RX ADMIN — PROPOFOL 200 MG: 10 INJECTION, EMULSION INTRAVENOUS at 12:39

## 2024-10-07 RX ADMIN — POLYETHYLENE GLYCOL 3350 17 G: 17 POWDER, FOR SOLUTION ORAL at 18:41

## 2024-10-07 RX ADMIN — GLYCOPYRROLATE 0.2 MG: 0.2 INJECTION INTRAMUSCULAR; INTRAVENOUS at 12:48

## 2024-10-07 RX ADMIN — SUCCINYLCHOLINE CHLORIDE 100 MG: 20 INJECTION, SOLUTION INTRAMUSCULAR; INTRAVENOUS at 12:39

## 2024-10-07 RX ADMIN — HYDRALAZINE HYDROCHLORIDE 75 MG: 25 TABLET ORAL at 16:15

## 2024-10-07 RX ADMIN — AMLODIPINE BESYLATE 5 MG: 5 TABLET ORAL at 21:42

## 2024-10-07 RX ADMIN — EPHEDRINE SULFATE 10 MG: 50 INJECTION, SOLUTION INTRAVENOUS at 13:14

## 2024-10-07 RX ADMIN — PANTOPRAZOLE SODIUM 40 MG: 40 TABLET, DELAYED RELEASE ORAL at 18:34

## 2024-10-07 RX ADMIN — ATORVASTATIN CALCIUM 40 MG: 40 TABLET, FILM COATED ORAL at 18:34

## 2024-10-07 RX ADMIN — REMIFENTANIL HYDROCHLORIDE 0.1 MCG/KG/MIN: 2 INJECTION, POWDER, LYOPHILIZED, FOR SOLUTION INTRAVENOUS at 12:56

## 2024-10-07 RX ADMIN — OXYCODONE HYDROCHLORIDE 10 MG: 5 TABLET ORAL at 22:39

## 2024-10-07 RX ADMIN — TAMSULOSIN HYDROCHLORIDE 0.4 MG: 0.4 CAPSULE ORAL at 21:42

## 2024-10-07 RX ADMIN — PROPOFOL 150 MCG/KG/MIN: 10 INJECTION, EMULSION INTRAVENOUS at 12:44

## 2024-10-07 RX ADMIN — FENTANYL CITRATE 50 MCG: 50 INJECTION, SOLUTION INTRAMUSCULAR; INTRAVENOUS at 16:02

## 2024-10-07 RX ADMIN — FENTANYL CITRATE 200 MCG: 50 INJECTION, SOLUTION INTRAMUSCULAR; INTRAVENOUS at 12:39

## 2024-10-07 RX ADMIN — DEXAMETHASONE SODIUM PHOSPHATE 10 MG: 4 INJECTION, SOLUTION INTRA-ARTICULAR; INTRALESIONAL; INTRAMUSCULAR; INTRAVENOUS; SOFT TISSUE at 18:36

## 2024-10-07 RX ADMIN — HYDROMORPHONE HYDROCHLORIDE 0.5 MG: 1 INJECTION, SOLUTION INTRAMUSCULAR; INTRAVENOUS; SUBCUTANEOUS at 16:24

## 2024-10-07 RX ADMIN — PHENYLEPHRINE HYDROCHLORIDE 10 MCG/MIN: 10 INJECTION INTRAVENOUS at 13:19

## 2024-10-07 RX ADMIN — HYDROMORPHONE HYDROCHLORIDE 0.5 MG: 1 INJECTION, SOLUTION INTRAMUSCULAR; INTRAVENOUS; SUBCUTANEOUS at 15:19

## 2024-10-07 RX ADMIN — POVIDONE-IODINE 1 EACH: 5 SOLUTION TOPICAL at 11:43

## 2024-10-07 RX ADMIN — ACETAMINOPHEN 975 MG: 325 TABLET ORAL at 18:33

## 2024-10-07 RX ADMIN — SODIUM CHLORIDE, POTASSIUM CHLORIDE, SODIUM LACTATE AND CALCIUM CHLORIDE: 600; 310; 30; 20 INJECTION, SOLUTION INTRAVENOUS at 11:42

## 2024-10-07 RX ADMIN — EPHEDRINE SULFATE 10 MG: 50 INJECTION, SOLUTION INTRAVENOUS at 13:16

## 2024-10-07 RX ADMIN — HYDROMORPHONE HYDROCHLORIDE 0.5 MG: 1 INJECTION, SOLUTION INTRAMUSCULAR; INTRAVENOUS; SUBCUTANEOUS at 17:12

## 2024-10-07 RX ADMIN — MORPHINE SULFATE 1 MG: 2 INJECTION, SOLUTION INTRAMUSCULAR; INTRAVENOUS at 20:49

## 2024-10-07 RX ADMIN — CEFAZOLIN 2 G: 2 INJECTION, POWDER, FOR SOLUTION INTRAMUSCULAR; INTRAVENOUS at 20:50

## 2024-10-07 RX ADMIN — HYDROMORPHONE HYDROCHLORIDE 0.5 MG: 1 INJECTION, SOLUTION INTRAMUSCULAR; INTRAVENOUS; SUBCUTANEOUS at 16:09

## 2024-10-07 RX ADMIN — OXYCODONE HYDROCHLORIDE 10 MG: 5 TABLET ORAL at 18:32

## 2024-10-07 RX ADMIN — FENTANYL CITRATE 50 MCG: 50 INJECTION, SOLUTION INTRAMUSCULAR; INTRAVENOUS at 15:57

## 2024-10-07 RX ADMIN — HYDRALAZINE HYDROCHLORIDE 5 MG: 20 INJECTION INTRAMUSCULAR; INTRAVENOUS at 16:00

## 2024-10-07 RX ADMIN — VANCOMYCIN HYDROCHLORIDE 1 G: 1 INJECTION, POWDER, LYOPHILIZED, FOR SOLUTION INTRAVENOUS at 11:43

## 2024-10-07 RX ADMIN — EPHEDRINE SULFATE 10 MG: 50 INJECTION, SOLUTION INTRAVENOUS at 15:05

## 2024-10-07 RX ADMIN — HYDRALAZINE HYDROCHLORIDE 75 MG: 25 TABLET ORAL at 21:43

## 2024-10-07 RX ADMIN — FENTANYL CITRATE 50 MCG: 50 INJECTION, SOLUTION INTRAMUSCULAR; INTRAVENOUS at 15:21

## 2024-10-07 RX ADMIN — LIDOCAINE HYDROCHLORIDE 100 MG: 20 INJECTION, SOLUTION INTRAVENOUS at 12:39

## 2024-10-07 ASSESSMENT — ENCOUNTER SYMPTOMS
VOMITING: 0
SHORTNESS OF BREATH: 0
BACK PAIN: 1
NAUSEA: 0
COUGH: 1

## 2024-10-07 NOTE — ASSESSMENT & PLAN NOTE
- Monitor postop blood sugars. SSIC prn  - Resume Jardiance/glucophage/  - Daily statin   - resume Losartan on discharge

## 2024-10-07 NOTE — OR SURGEON
Pre-Procedure patient identification:  I am the primary operating surgeon/proceduralist and I have reviewed the applicable pathology reports and radiology studies for this procedure. I have identified the patient on 10/07/24 at 12:11 PM Krish Lucas MD  Phone Number: [phone]

## 2024-10-07 NOTE — ASSESSMENT & PLAN NOTE
- BP readings labile  - Would continue his usual dose of Norvasc 5mg daily , Coreg 3.125mg bid, Hydralazine 75mg tid, Imdur 30mg daily, and Losartan 50mg daily on discharge  - Home BP monitoring  - Pain management

## 2024-10-07 NOTE — ASSESSMENT & PLAN NOTE
- No active CAD symptoms  - BP management as outlined  - Daily statin   - Resume daily aspirin when cleared by ortho

## 2024-10-07 NOTE — CONSULTS
Inpatient Cardiology   Consult Note       Overview: s/p cervical spine surgery. [facility] consulted for postop medical /CV management     Assessment/Plan   Essential hypertension  Assessment & Plan  - Increased BP readings in the PACU---took his am dose of Coreg , Hydralazine and Imdur  - Will give missed dose of Hydralazine 75mg po now  - Resume his usual dose of Coreg, Losartan, Hydralazine, Imdur and Norvasc once he returns to the floor  - Telemetry on [facility]  - Pain management    * Cervical spine arthritis with nerve pain  Overview  - s/p C2-C7 PDF on October 7, 2024.     Assessment & Plan  - DVT prophylaxis and pain management per ortho service  - Pulmonary toilet  - Ambulate  - Bowel regimen  - Hold all natural supplements. Resume post op when ok to do so per ortho      Diabetes mellitus type II, non insulin dependent (CMS/Formerly [facility])  Assessment & Plan  - Monitor postop blood sugars. SSIC prn  - Continue Jardiance  - Resume Losartan as postop BP and renal function allow  - Daily statin     AAA (abdominal aortic aneurysm) (CMS/Formerly [facility])  Assessment & Plan  - BP elevated postop   - Will give missed dose of Hydralazine 75mg po now  - Resume his usual dose of Coreg, Losartan, Hydralazine, Norvasc ,Imdur once he returns to the floor    History of ischemic cardiomyopathy  Assessment & Plan  - Clinically well compensated.   - Previously on Entresto but stopped due to cost.   - Will resume GDMTs including Jardiance, Imdur/Hydralazine, Coreg and Losartan  - BP elevated. Hopefully with pain management and resumption of all his anti-hypertensives BP will be at goal by am    CAD (coronary artery disease)  Assessment & Plan  - Accelerated postop BP readings  - Transfer to [facility] when recovery phase is completed  - Will give missed dose of Hydralazine 75mg po now. Resume his usual dose of Coreg, Losartan, Hydralazine, Imdur and Norvasc once he returns to the floor  - Daily statin   - Resume daily aspirin when cleared by  ortho    Neuropathy  Assessment & Plan  - Continue Neurontin  - Postop spinal management per ortho            Subjective neck pain    HPI: 72 yo s/p cervical spine surgery. [facility] consulted for postop medical /CV management. Pt has an extensive CV history that is managed with [facility]. In PACU he is c/o ha, neck pain, numbness in his hands/feet , and blurred vision that has been ongoing for 6-8 weeks. He denies cp , dizziness, or sob.    Review of Systems   Cardiovascular:  Positive for leg swelling. Negative for chest pain.   Respiratory:  Positive for cough. Negative for shortness of breath.    Musculoskeletal:  Positive for back pain and joint pain.   Gastrointestinal:  Negative for nausea and vomiting.   All other systems reviewed and are negative.     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    Cataract extraction      hx of    Cholecystectomy      Coronary artery bypass graft      Liver surgery      removed the abcess       Social History:    Social History     Tobacco Use   Smoking Status Never   Smokeless Tobacco Never     Social History     Social History Narrative    Not on file       Family History:   Family History   Problem Relation Name Age of Onset    No Known Problems Biological Mother      Prostate cancer Biological Father          Allergies:   Lisinopril and Topamax [topiramate]   Current Medications:    Scheduled:   hydrALAZINE        HYDROmorphone        povidone-iodine  1 Application Each Nostril 60 min Pre-Op       Infusions:   lactated ringer's   125 mL/hr at 10/07/24 1142    lactated ringer's           PRN:    atropine    fentaNYL     hydrALAZINE    hydrALAZINE    HYDROmorphone    HYDROmorphone    labetalol    meperidine    midazolam    ondansetron    oxyCODONE    Home medications:  Medications Prior to Admission   Medication Sig    amLODIPine (NORVASC) 5 mg tablet Take 5 mg by mouth daily.    aspirin 81 mg enteric coated tablet Take 81 mg by mouth daily. Last dose 2 weeks ago    atorvastatin (LIPITOR) 40 mg tablet Take 40 mg by mouth daily.    carvediloL (COREG) 3.125 mg tablet Take 3.125 mg by mouth 2 (two) times a day with meals.    hydrALAZINE (APRESOLINE) 50 mg tablet Take 75 mg by mouth 3 (three) times a day.    isosorbide mononitrate (IMDUR) 30 mg 24 hr tablet Take 30 mg by mouth daily.    losartan (COZAAR) 50 mg tablet Take 50 mg by mouth daily.    mirtazapine (REMERON) 15 mg tablet Take 15 mg by mouth nightly.    tamsulosin (FLOMAX) 0.4 mg capsule Take 0.4 mg by mouth nightly.    empagliflozin (JARDIANCE) 10 mg tablet Take 10 mg by mouth daily. Last dose 10/3/24        Objective   Vital signs in last 24 hours:  Temp:  [36.1 °C (96.9 °F)-36.2 °C (97.2 °F)] 36.1 °C (96.9 °F)  Heart Rate:  [48-81] 62  Resp:  [11-40] 26  BP: (157-216)/(65-91) 157/65    Wt Readings from Last 7 Encounters:   10/02/24 97.1 kg (214 lb)       Physical Exam  Vitals reviewed.   Constitutional:       Appearance: He is well-developed.   HENT:      Head: Normocephalic and atraumatic.      Mouth/Throat:      Mouth: Mucous membranes are moist.   Eyes:      General: No scleral icterus.  Neck:      Vascular: No JVD.      Comments: Cervical collar  Cardiovascular:      Rate and Rhythm: Normal rate and regular rhythm.      Pulses: Normal pulses.           Dorsalis pedis pulses are 2+ on the right side and 2+ on the left side.      Heart sounds: S1 normal and S2 normal. No murmur (II/VI VALENTE at base) heard.  Pulmonary:      Breath sounds: No wheezing or rales.   Abdominal:      General: Bowel sounds are normal.      Palpations: Abdomen is soft.   Musculoskeletal:       Cervical back: Tenderness present.   Skin:     General: Skin is warm and dry.   Neurological:      Mental Status: He is alert and oriented to person, place, and time.   Psychiatric:         Attention and Perception: Attention normal.         Mood and Affect: Mood normal.         Speech: Speech normal.         Behavior: Behavior is cooperative.         Thought Content: Thought content normal.         Cognition and Memory: Cognition and memory normal.            Labs and Data:     Hematology  Lab Results   Component Value Date    WBC 5.44 09/16/2024    HGB 11.7 (L) 09/16/2024    HCT 34.4 (L) 09/16/2024     (L) 09/16/2024    INR 1.2 09/16/2024       Chemistries  Lab Results   Component Value Date     09/16/2024    K 4.3 09/16/2024     09/16/2024    CREATININE 1.8 (H) 09/16/2024    BUN 29 (H) 09/16/2024    CO2 28 09/16/2024    GLUCOSE 185 (H) 09/16/2024    CALCIUM 9.8 09/16/2024         Endocrine  Lab Results   Component Value Date    HGBA1C 6.1 (H) 09/16/2024      Radiology:      X-RAY SPINE 1 VIEW    Result Date: 10/7/2024  IMPRESSION: Postoperative changes status post laminectomy at C3-C6 and posterior instrumented fusion at C3-C7 without evidence of complication. COMMENT: Comparison: Intraoperative cervical spine x-rays of earlier the same day. Technique: A frontal intraoperative radiograph of the cervical spine was obtained. FINDINGS: There are postoperative changes status post posterior instrumented fusion at C3-C7 and laminectomy at C3-C6 with placement of bilateral longitudinal rods and lateral mass screws without evidence of hardware complication.  There is a surgical drain projecting over the neck.     X-RAY SPINE 1 VIEW    Result Date: 10/7/2024  IMPRESSION: Postoperative changes status post interval laminectomy at C3-C6 and posterior instrumented fusion at C3-C7 without evidence of complication. COMMENT: Comparison: Intraoperative cervical spine x-ray of earlier the same day. Technique: A  single lateral intraoperative radiograph of the cervical spine was obtained. FINDINGS: There are postsurgical changes of interval laminectomy at C3-C6 and posterior instrumented fusion at C3-C7 with placement of bilateral longitudinal rods and lateral mass screws.  There is a surgical drain overlying the posterior soft tissues of the neck with adjacent expected postoperative soft tissue emphysema.     X-RAY SPINE 1 VIEW    Result Date: 10/7/2024  IMPRESSION: Intraoperative localization posterior to C3. COMMENT: Comparison: None. Technique: A single lateral intraoperative projection of the cervical spine was obtained for the purpose of localization. FINDINGS: A localizer is seen posteriorly at C3.  There is straightening of the cervical lordosis.  Vertebral body heights are maintained.  There is no subluxation. There is multilevel intervertebral disc space narrowing and endplate osteophyte formation which is moderate to severe at C3-C6.  There is a surgical mesh noted overlying the occipital calvarium.       Cardiology:    Telemetry  First degree AVB                      ANTOINE Cochran  10/7/2024  4:43 PM

## 2024-10-07 NOTE — ANESTHESIOLOGIST PRE-PROCEDURE ATTESTATION
Pre-Procedure Patient Identification:  I am the Primary Anesthesiologist and have identified the patient on 10/07/24 at 12:27 PM.   I have confirmed the procedure(s) will be performed by the following surgeon/proceduralist Krish Lucas MD.

## 2024-10-07 NOTE — ANESTHESIA PROCEDURE NOTES
Airway  Urgency: elective    Start Time: 10/7/2024 12:40 PM    General Information and Staff    Patient location during procedure: OR  Anesthesiologist: Alejandro Bailey MD  Resident/CRNA: Berkley Guillen CRNA  Performed: resident/CRNA   Performed by: Berkley Guillen CRNA  Authorized by: Alejandro Bailey MD      Indications and Patient Condition  Indications for airway management: anesthesia  Sedation level: deep  Preoxygenated: yes  Patient position: sniffing  Mask difficulty assessment: 1 - vent by mask    Final Airway Details  Final airway type: endotracheal airway      Successful airway: ETT  Cuffed: yes   Successful intubation technique: direct laryngoscopy  Facilitating devices/methods: intubating stylet  Endotracheal tube insertion site: oral  Blade: Felipe  Blade size: #4  ETT size (mm): 7.5  Cormack-Lehane Classification: grade IIb - view of arytenoids or posterior of glottis only  Placement verified by: chest auscultation and capnometry   Measured from: lips  ETT to lips (cm): 23  Number of attempts at approach: 1  Number of other approaches attempted: 0  Atraumatic airway insertion

## 2024-10-07 NOTE — ANESTHESIA PREPROCEDURE EVALUATION
Relevant Problems   No relevant active problems       Anesthesia ROS/MED HX      Cardiovascular   Cardiac clearance reviewed and ECG reviewed  ROS/MED HX Comments:    Cardiology: Cardiac Clearance s/p sent and multivessel bypass surgery, Ischemic cardiomyopothy recovered to EF 60-65% per most recent ECHO 2/2024. .Denies chest pain, pressure, shortness of breath, palpitations, or chest tightness. Able to go up 2 flights of stairs without stopping. > 4 METs       .  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    Cataract extraction      hx of    Cholecystectomy      Coronary artery bypass graft      Liver surgery      removed the abcess       Physical Exam    Airway   Mallampati: III   TM distance: >3 FB   Neck ROM: limited  Cardiovascular    Rhythm: regular   Rate: normalPulmonary - normal   clear to auscultation  Dental    Teeth Problems: caps        Anesthesia Plan    Plan: general    Technique: general endotracheal and total IV anesthesia     Lines and Monitors: PIV, SSEP/MEP and additional IV     Airway: video laryngoscope and oral intubation    3 ASA  Blood Products:     Use of Blood Products Discussed: Yes     Consented to blood products  Anesthetic plan and risks discussed with: patient  Induction:    intravenous   Postop Plan:   Patient Disposition: inpatient floor planned admission   Pain Management: IV analgesics  Comments:    Plan: [place] for close BP monitoring, will consiter A-line if unreliable

## 2024-10-07 NOTE — ASSESSMENT & PLAN NOTE
- DVT prophylaxis and pain management per ortho service  - Pulmonary toilet  - Ambulate  - Bowel regimen  - Hold all natural supplements. Resume post op when ok to do so per ortho    - Medically stable for discharge

## 2024-10-07 NOTE — PROGRESS NOTES
C/o neck pain    Pt seen and examined in PACU  Drowsy, arousable to voice, NAD, appears comfortable  Denies pain  VSS  [place] J cervical collar in place  dressing c/d/i  SWAPNIL drain in place with bloody drainage  SILT B/L UE  5/5 strength B/L UE/LE muscle groups  + radial pulses B/L; fingers warm, pink, brisk cap refill  Garrison in place draining clear, yellow urine    A/P: POD #0 s/p PCDF under general anesthesia  d/w Attending  -Pain management   -DVT prophylaxis: chemical prophylaxis contraindicated per  due to post-op bleeding risk; mechanical prophylaxis with SCD's B/L LE and early ambulation  -PT/OT/OOB  -hard cervical collar must be in place at all times  -Xray in PACU  -abx IV for duration of drain   -Empty drain q8 hours and document output  -Garrison and IVF to be D/C'ed POD #1  -Decadron IV x 3 doses  -Medical management as per [facility]

## 2024-10-07 NOTE — ASSESSMENT & PLAN NOTE
- Clinically well compensated.   - Previously on Entresto but stopped due to cost.   - Continue GDMTs including Jardiance, Imdur/Hydralazine, Coreg and Losartan on discharge

## 2024-10-07 NOTE — ANESTHESIA POSTPROCEDURE EVALUATION
Patient: Roberth Amato    Procedure Summary       Date: 10/07/24 Room / Location:  [facility] OR 03 / [facility] OR [facility]    Anesthesia Start: 1232 Anesthesia Stop: 1540    Procedure: C2-7 Posterior cervical decompression and fusion,instrumentation,allograft,autograft (Neck) Diagnosis:       Cervical spondylosis with myelopathy      Cervical spinal stenosis      (spondylosis w/myelopathy cervical region)      (stenosis cervical region)    Surgeons: Krish Lucas MD Responsible Provider: Alejandro Bailey MD    Anesthesia Type: general ASA Status: 3            Anesthesia Type: general  PACU Vitals  10/7/2024 1538 - 10/7/2024 1638        10/7/2024  1540 10/7/2024  1545 10/7/2024  1557 10/7/2024  1600    BP: 193/85 212/80 182/77 204/88    Temp: 36.1 °C (96.9 °F) -- -- --    Pulse: 81 72 66 62    Resp: 40 15 29 11    SpO2: -- -- -- --                10/7/2024  1602 10/7/2024  1609 10/7/2024  1610 10/7/2024  1615    BP: 204/88 198/79 198/79 216/91    Temp: -- -- -- --    Pulse: 61 64 64 62    Resp: 11 22 22 18    SpO2: -- -- -- --                10/7/2024  1620 10/7/2024  1630          BP: 216/91 157/65      Temp: -- --      Pulse: 63 62      Resp: 22 24      SpO2: -- 99 %                Anesthesia Post Evaluation    Pain management: adequate  Mode of pain management: IV medication  Patient location during evaluation: PACU  Patient participation: complete - patient participated  Level of consciousness: awake and alert  Cardiovascular status: acceptable  Airway Patency: adequate  Respiratory status: acceptable  Hydration status: acceptable  Anesthetic complications: no

## 2024-10-07 NOTE — DISCHARGE INSTRUCTIONS
Dr. Krish Lucas MD     DISCAHRGE INSTRUCTIONS: CERVICAL SURGERY      IMPORTANT! Please read entire packet.     INCISION      Please make sure your incisions are checked at least twice daily for signs and symptoms of infection. If any of the below should occur, please call the office [phone] or [phone].      Drainage or leaking from the incision site      Opening of incision      Fevers greater than 101.5 (low grade fevers post-op are normal)      Flu- like symptoms      Increased redness and/ or tenderness around the incision      If anterior surgery, you most likely will have steri-strips and dissolvable sutures. Posterior surgery sutures will be removed at the 2-week post-op visit.      COLLAR      You will be given a cervical collar to wear after surgery. You must wear this collar at all times until seen in the office for your first post-op visit. You do not need to wear it in the shower. You can remove the collar to eat.     We recommend you sleep in a recliner for 1-2 weeks after surgery for comfort and to relieve the swelling. You may sleep in whatever position makes you comfortable. It is normal to experience discomfort      GAUZE DRESSING and SHOWERING      As long as the incision isn’t draining, you may shower 2 days after surgery. However, we do ask that you avoid wetting the incision for 4-5 days. Pat dry.           Gently, dry the incision site and replace gauze dressing with tape, if needed. Some like to wear a dressing for extra padding against the collar, but it isn’t necessary.                You may wash your hair when taking a shower. You may not take a tub bath or submerge in a pool/hot renny until seen in the office.           You do not need to apply any ointment or cream to the incision site.           You may shave after 5 days.           EXERCISE      You have unlimited walking and stair climbing privileges.      Walking outside (in nice weather) or walking on a treadmill (no  incline) is also allowed.      NO RUNING OR HIGH IMPACT ACTIVITY!!!     Do not lift anything greater than 10-15 lbs. and avoid overhead lifting and reaching.      EATING      It is normal to have a sore throat following this type of neck surgery as well as having a breathing tube.      Solid foods may be difficult to swallow at first. A soft diet is recommended for those who can’t swallow large boluses of food. Ensure or Boost glycerin type shakes for extra calories.      CONSTIPATION      It is normal to be constipated after surgery due to anesthesia and pain medications. Make sure to stay hydrated, eat fiber, get up and walk. You can also use warm prune juice and get over the counter MiraLAX, suppositories, enemas, and Magnesium Citrate if constipation persist. Call our office with any concerns at [phone].          PAIN      Pain is expected after surgery. You should have a pain medication and muscle relaxer when you leave the hospital. Take the pain medication as needed (do not exceed the directed dose without calling the office). Take 1 muscle relaxer for muscle spams, you can take it 3 times a day if needed. It is usually most helpful at night and in the morning.           DO NOT take any anti-inflammatories (Like Motrin, Ibuprofen, Advil, Aleve, naproxen, etc.) for 12 weeks after surgery. Taking anti-inflammatories can interfere with fusion healing. You MAY take Tylenol products- acetaminophen (3000mg/day maximum) unless you have a medical condition and you were instructed not to take.           You may use ice/heat across shoulder and back of neck, but do not place directly on the incision site.           Do NOT resume taking Fosamax or Actonel or other osteoporosis medications until 2 weeks after surgery.           You must not use nicotine for at least 12 weeks after surgery including smoking, the patch, nicotine gum and secondhand smoke.      BLOODCLOTS     Some swelling is normal post operatively. If  you notice swelling in one or both of your legs that is painful and/or hot to the touch and/ or you have shortness of breath - please give us a call immediately #[phone]      DRIVING      You may not drive a car until told otherwise by your physician. You will be in a collar until your first office visit and will be assessed at that point.           You may be a passenger in a car, but make sure to make several stops if it is going to be a long trip (1hr+).      FOLLOW UP APPOINTMENTS      Your first post-op visit will be      Date: ____________________Time: ______________Location: ________________          If you have an additional questions/ concerns please contact Felicia Scott, Nurse Practitioner, [facility] or [facility] at [phone] or log into your patient portal.

## 2024-10-08 LAB
ANION GAP SERPL CALC-SCNC: 9 MEQ/L (ref 3–15)
BASOPHILS # BLD: 0.01 K/UL (ref 0.01–0.1)
BASOPHILS NFR BLD: 0.2 %
BUN SERPL-MCNC: 27 MG/DL (ref 7–25)
CALCIUM SERPL-MCNC: 9.2 MG/DL (ref 8.6–10.3)
CHLORIDE SERPL-SCNC: 105 MEQ/L (ref 98–107)
CO2 SERPL-SCNC: 23 MEQ/L (ref 21–31)
CREAT SERPL-MCNC: 1.3 MG/DL (ref 0.7–1.3)
DIFFERENTIAL METHOD BLD: ABNORMAL
EGFRCR SERPLBLD CKD-EPI 2021: 57.3 ML/MIN/1.73M*2
EOSINOPHIL # BLD: 0 K/UL (ref 0.04–0.54)
EOSINOPHIL NFR BLD: 0 %
ERYTHROCYTE [DISTWIDTH] IN BLOOD BY AUTOMATED COUNT: 12.1 % (ref 11.6–14.4)
GLUCOSE BLD-MCNC: 193 MG/DL (ref 70–99)
GLUCOSE BLD-MCNC: 194 MG/DL (ref 70–99)
GLUCOSE BLD-MCNC: 231 MG/DL (ref 70–99)
GLUCOSE BLD-MCNC: 235 MG/DL (ref 70–99)
GLUCOSE SERPL-MCNC: 208 MG/DL (ref 70–99)
HCT VFR BLD AUTO: 35.4 % (ref 40.1–51)
HGB BLD-MCNC: 11.9 G/DL (ref 13.7–17.5)
IMM GRANULOCYTES # BLD AUTO: 0.03 K/UL (ref 0–0.08)
IMM GRANULOCYTES NFR BLD AUTO: 0.5 %
LYMPHOCYTES # BLD: 0.36 K/UL (ref 1.2–3.5)
LYMPHOCYTES NFR BLD: 5.7 %
MCH RBC QN AUTO: 30.3 PG (ref 28–33.2)
MCHC RBC AUTO-ENTMCNC: 33.6 G/DL (ref 32.2–36.5)
MCV RBC AUTO: 90.1 FL (ref 83–98)
MONOCYTES # BLD: 0.07 K/UL (ref 0.3–1)
MONOCYTES NFR BLD: 1.1 %
NEUTROPHILS # BLD: 5.86 K/UL (ref 1.7–7)
NEUTS SEG NFR BLD: 92.5 %
NRBC BLD-RTO: 0 %
PLATELET # BLD AUTO: 107 K/UL (ref 150–350)
PMV BLD AUTO: 10 FL (ref 9.4–12.4)
POCT TEST: ABNORMAL
POTASSIUM SERPL-SCNC: 4.8 MEQ/L (ref 3.5–5.1)
RBC # BLD AUTO: 3.93 M/UL (ref 4.5–5.8)
SODIUM SERPL-SCNC: 137 MEQ/L (ref 136–145)
WBC # BLD AUTO: 6.33 K/UL (ref 3.8–10.5)

## 2024-10-08 PROCEDURE — 63600000 HC DRUGS/DETAIL CODE: Mod: JW | Performed by: NURSE PRACTITIONER

## 2024-10-08 PROCEDURE — 97166 OT EVAL MOD COMPLEX 45 MIN: CPT | Mod: GO

## 2024-10-08 PROCEDURE — 99232 SBSQ HOSP IP/OBS MODERATE 35: CPT | Mod: FS | Performed by: INTERNAL MEDICINE

## 2024-10-08 PROCEDURE — 63700000 HC SELF-ADMINISTRABLE DRUG: Performed by: NURSE PRACTITIONER

## 2024-10-08 PROCEDURE — 80048 BASIC METABOLIC PNL TOTAL CA: CPT | Performed by: NURSE PRACTITIONER

## 2024-10-08 PROCEDURE — 97530 THERAPEUTIC ACTIVITIES: CPT | Mod: GP

## 2024-10-08 PROCEDURE — 20600000 HC ROOM AND CARE INTERMEDIATE/TELEMETRY

## 2024-10-08 PROCEDURE — 25800000 HC PHARMACY IV SOLUTIONS: Performed by: NURSE PRACTITIONER

## 2024-10-08 PROCEDURE — 85025 COMPLETE CBC W/AUTO DIFF WBC: CPT | Performed by: NURSE PRACTITIONER

## 2024-10-08 PROCEDURE — 63600000 HC DRUGS/DETAIL CODE: Performed by: NURSE PRACTITIONER

## 2024-10-08 PROCEDURE — 97162 PT EVAL MOD COMPLEX 30 MIN: CPT | Mod: GP

## 2024-10-08 PROCEDURE — 36415 COLL VENOUS BLD VENIPUNCTURE: CPT | Performed by: NURSE PRACTITIONER

## 2024-10-08 RX ORDER — CARVEDILOL 3.12 MG/1
3.12 TABLET ORAL 2 TIMES DAILY WITH MEALS
Status: DISCONTINUED | OUTPATIENT
Start: 2024-10-08 | End: 2024-10-08

## 2024-10-08 RX ORDER — METFORMIN HYDROCHLORIDE 500 MG/1
500 TABLET ORAL
Status: DISCONTINUED | OUTPATIENT
Start: 2024-10-08 | End: 2024-10-11 | Stop reason: HOSPADM

## 2024-10-08 RX ORDER — IBUPROFEN/PSEUDOEPHEDRINE HCL 200MG-30MG
3 TABLET ORAL NIGHTLY PRN
Status: DISCONTINUED | OUTPATIENT
Start: 2024-10-08 | End: 2024-10-11 | Stop reason: HOSPADM

## 2024-10-08 RX ORDER — INSULIN LISPRO 100 [IU]/ML
6-10 INJECTION, SOLUTION INTRAVENOUS; SUBCUTANEOUS
Status: DISCONTINUED | OUTPATIENT
Start: 2024-10-08 | End: 2024-10-11 | Stop reason: HOSPADM

## 2024-10-08 RX ORDER — CARVEDILOL 3.12 MG/1
3.12 TABLET ORAL 2 TIMES DAILY WITH MEALS
Status: DISCONTINUED | OUTPATIENT
Start: 2024-10-08 | End: 2024-10-09

## 2024-10-08 RX ADMIN — OXYCODONE HYDROCHLORIDE 10 MG: 5 TABLET ORAL at 17:45

## 2024-10-08 RX ADMIN — DEXAMETHASONE SODIUM PHOSPHATE 10 MG: 4 INJECTION, SOLUTION INTRA-ARTICULAR; INTRALESIONAL; INTRAMUSCULAR; INTRAVENOUS; SOFT TISSUE at 13:03

## 2024-10-08 RX ADMIN — EMPAGLIFLOZIN 10 MG: 10 TABLET, FILM COATED ORAL at 08:29

## 2024-10-08 RX ADMIN — CARVEDILOL 3.12 MG: 3.12 TABLET, FILM COATED ORAL at 13:03

## 2024-10-08 RX ADMIN — CEFAZOLIN 2 G: 2 INJECTION, POWDER, FOR SOLUTION INTRAMUSCULAR; INTRAVENOUS at 21:00

## 2024-10-08 RX ADMIN — ACETAMINOPHEN 975 MG: 325 TABLET ORAL at 07:02

## 2024-10-08 RX ADMIN — METFORMIN HYDROCHLORIDE 500 MG: 500 TABLET ORAL at 13:03

## 2024-10-08 RX ADMIN — SENNOSIDES AND DOCUSATE SODIUM 1 TABLET: 8.6; 5 TABLET ORAL at 21:01

## 2024-10-08 RX ADMIN — AMLODIPINE BESYLATE 5 MG: 5 TABLET ORAL at 21:03

## 2024-10-08 RX ADMIN — OXYCODONE HYDROCHLORIDE 10 MG: 5 TABLET ORAL at 13:10

## 2024-10-08 RX ADMIN — SENNOSIDES AND DOCUSATE SODIUM 1 TABLET: 8.6; 5 TABLET ORAL at 08:29

## 2024-10-08 RX ADMIN — INSULIN LISPRO 6 UNITS: 100 INJECTION, SOLUTION INTRAVENOUS; SUBCUTANEOUS at 17:40

## 2024-10-08 RX ADMIN — ASPIRIN 81 MG: 81 TABLET, COATED ORAL at 08:29

## 2024-10-08 RX ADMIN — INSULIN LISPRO 6 UNITS: 100 INJECTION, SOLUTION INTRAVENOUS; SUBCUTANEOUS at 21:06

## 2024-10-08 RX ADMIN — CEFAZOLIN 2 G: 2 INJECTION, POWDER, FOR SOLUTION INTRAMUSCULAR; INTRAVENOUS at 04:15

## 2024-10-08 RX ADMIN — HYDRALAZINE HYDROCHLORIDE 75 MG: 25 TABLET ORAL at 21:02

## 2024-10-08 RX ADMIN — TAMSULOSIN HYDROCHLORIDE 0.4 MG: 0.4 CAPSULE ORAL at 21:01

## 2024-10-08 RX ADMIN — OXYCODONE HYDROCHLORIDE 10 MG: 5 TABLET ORAL at 07:03

## 2024-10-08 RX ADMIN — CARVEDILOL 3.12 MG: 3.12 TABLET, FILM COATED ORAL at 17:41

## 2024-10-08 RX ADMIN — POLYETHYLENE GLYCOL 3350 17 G: 17 POWDER, FOR SOLUTION ORAL at 08:29

## 2024-10-08 RX ADMIN — DEXAMETHASONE SODIUM PHOSPHATE 10 MG: 4 INJECTION, SOLUTION INTRA-ARTICULAR; INTRALESIONAL; INTRAMUSCULAR; INTRAVENOUS; SOFT TISSUE at 02:38

## 2024-10-08 RX ADMIN — LOSARTAN POTASSIUM 50 MG: 50 TABLET ORAL at 08:29

## 2024-10-08 RX ADMIN — CEFAZOLIN 2 G: 2 INJECTION, POWDER, FOR SOLUTION INTRAMUSCULAR; INTRAVENOUS at 13:04

## 2024-10-08 RX ADMIN — OXYCODONE HYDROCHLORIDE 10 MG: 5 TABLET ORAL at 22:24

## 2024-10-08 RX ADMIN — HYDRALAZINE HYDROCHLORIDE 75 MG: 25 TABLET ORAL at 05:36

## 2024-10-08 RX ADMIN — ACETAMINOPHEN 975 MG: 325 TABLET ORAL at 13:03

## 2024-10-08 RX ADMIN — ISOSORBIDE MONONITRATE 30 MG: 30 TABLET, EXTENDED RELEASE ORAL at 08:29

## 2024-10-08 RX ADMIN — PANTOPRAZOLE SODIUM 40 MG: 40 TABLET, DELAYED RELEASE ORAL at 08:29

## 2024-10-08 RX ADMIN — ATORVASTATIN CALCIUM 40 MG: 40 TABLET, FILM COATED ORAL at 17:41

## 2024-10-08 RX ADMIN — OXYCODONE HYDROCHLORIDE 10 MG: 5 TABLET ORAL at 02:46

## 2024-10-08 RX ADMIN — HYDRALAZINE HYDROCHLORIDE 75 MG: 25 TABLET ORAL at 13:10

## 2024-10-08 RX ADMIN — ACETAMINOPHEN 975 MG: 325 TABLET ORAL at 00:07

## 2024-10-08 RX ADMIN — ACETAMINOPHEN 975 MG: 325 TABLET ORAL at 17:41

## 2024-10-08 ASSESSMENT — COGNITIVE AND FUNCTIONAL STATUS - GENERAL
MOVING TO AND FROM BED TO CHAIR: 3 - A LITTLE
CLIMB 3 TO 5 STEPS WITH RAILING: 3 - A LITTLE
DRESSING REGULAR LOWER BODY CLOTHING: 3 - A LITTLE
CLIMB 3 TO 5 STEPS WITH RAILING: 3 - A LITTLE
STANDING UP FROM CHAIR USING ARMS: 3 - A LITTLE
DRESSING REGULAR UPPER BODY CLOTHING: 3 - A LITTLE
WALKING IN HOSPITAL ROOM: 3 - A LITTLE
MOVING TO AND FROM BED TO CHAIR: 3 - A LITTLE
HELP NEEDED FOR PERSONAL GROOMING: 3 - A LITTLE
HELP NEEDED FOR BATHING: 3 - A LITTLE
TOILETING: 3 - A LITTLE
STANDING UP FROM CHAIR USING ARMS: 3 - A LITTLE
AFFECT: WFL
EATING MEALS: 4 - NONE
WALKING IN HOSPITAL ROOM: 3 - A LITTLE
MOVING TO AND FROM BED TO CHAIR: 3 - A LITTLE
STANDING UP FROM CHAIR USING ARMS: 3 - A LITTLE
CLIMB 3 TO 5 STEPS WITH RAILING: 3 - A LITTLE
WALKING IN HOSPITAL ROOM: 3 - A LITTLE
AFFECT: WFL

## 2024-10-08 NOTE — PROGRESS NOTES
Occupational Therapy -  Initial Evaluation     Patient: Roberth Amato  Location: [place] [address]  MRN: [id number]  Today's date: 10/8/2024    HISTORY OF PRESENT ILLNESS     Roberth is a 73 y.o. male admitted on 10/7/2024 with Cervical spondylosis with myelopathy [M47.12]  Cervical spinal stenosis [M48.02]  Cervical spine arthritis with nerve pain [M47.812, M79.2]  Paroxysmal atrial fibrillation (CMS/HCC) [I48.0]. Principal problem is Cervical spine arthritis with nerve pain.    Past Medical History  Roberth has a past medical history of Anemia, Hearing decreased, bilateral, Hypertension, Numbness and tingling, Sleep apnea, and Type 2 diabetes mellitus (CMS/HCC).    History of Present Illness  S/p C2-7 Posterior cervical decompression and fusion,instrumentation,allograft,autograft    PRIOR LEVEL OF FUNCTION AND LIVING ENVIRONMENT     Prior Level of Function      Flowsheet Row Most Recent Value   Dominant Hand right   Ambulation independent   Transferring independent   Toileting independent   Bathing independent   Dressing independent   Eating independent   IADLs independent   Driving/Transportation    Communication understands/communicates without difficulty   Swallowing swallows foods/liquids without difficulty   Prior Level of Function Comment typically ind w/ ADLs, IADLs, walks 2 miles every other day for exercise, no AD, drives, is retired practice management for physician groups   Assistive Device Currently Used at Home CPAP, other (see comments)  [B/L  hearing aides and ]             Prior Living Environment      Flowsheet Row Most Recent Value   People in Home spouse   Current Living Arrangements home   Home Accessibility stairs within home (Group)   Living Environment Comment Lives in a 2 SH w/ wife, 0 JOSE, AR on 1st, 18 steps to 2nd fl bed/bath w/ tub/shower w/ GB (can borrow a shower chair)          Occupational Profile      Flowsheet Row Most Recent Value    Performance Patterns walks 1-2 miles a week   Environmental Supports and Barriers reports spouse is supportive and can assist as needed          VITALS AND PAIN        Therapy Pain/Vitals       Row Name 10/08/24 0937 10/08/24 0954       Pain/Comfort/Sleep    Pain Charting Type Pain Assessment --    Preferred Pain Scale number (Numeric Rating Pain Scale) --    (0-10) Pain Rating: Rest 7 --    (0-10) Pain Rating: Activity 7 --    Pain Side/Orientation posterior --    Pain Body Location neck --    Pain Description constant --       Vitals    Pulse 58 63    SpO2 96 % 97 %    Patient Activity At rest At rest    Oxygen Therapy None (Room air) None (Room air)    /50 151/50    BP Location Right upper arm Right upper arm    BP Method Automatic Automatic    Patient Position Sitting Sitting                          Objective   OBJECTIVE     Start time:  0947  End time:  1009  Session Length: 22 min  Mode of Treatment: co-treatment  Reason for co-treatment: co treat to expedite discharge    General Observations  Patient received reclined, in chair, in therapy gym. He was agreeable to therapy, no issues or concerns identified by nurse prior to session.      Precautions: spinal, brace worn at all times, fall (hard collar at all times)       Limitations/Impairments: safety/cognitive, hearing      OT Eval and Treat - 10/08/24 0947          Cognition    Orientation Status oriented x 3     Affect/Mental Status WFL     Follows Commands repetition of directions required     Cognitive Function executive function deficit     Executive Function Deficit judgment;planning/decision-making;problem-solving/reasoning;insight/awareness of deficits;information processing        Vision Assessment/Intervention    Vision Assessment corrective lenses for reading        Hearing Assessment    Hearing Status hearing aid, bilateral        Sensory Assessment    Sensory Assessment other (see comments)   reports baseline about 1 year hand tingling,  3m forearm tingling, 1 month biceps tingling. Light touch intact B UEs. Describes good strength but pre surgery occasional difficulty writing, buttoning clothing.       Upper Extremity Assessment    General Observations Not formally tested due to pain and spinal precautions        Bed Mobility    [place] not tested     Comment OOB throughout session        Mobility Belt    Mobility Belt Used During Session yes        Sit/Stand Transfer    Surface chair with arm rests     [place] minimum assist (75% or more patient effort)     Safety/Cues increased time to complete;verbal cues;hand placement;proper use of assistive device;preparatory posture;technique     Assistive Device walker, front-wheeled     Transfer Comments min A to boost/steady        Toilet Transfer    Transfer Technique sit/stand     [place] close supervision     Safety/Cues verbal cues;hand placement;proper use of assistive device;preparatory posture;technique     Assistive Device grab bars/safety frame;walker, front-wheeled     Comment std toilet with GB support to simulate GB near upstairs toilet at home        Shower/Tub Transfer    Transfer Type Tub     Transfer Technique step over, right entry     [place] minimum assist (75% or more patient effort);1 person assist     Safety/Cues increased time to complete;verbal cues;hand placement;proper use of assistive device;preparatory posture;technique     Assistive Device none     Comment use of wall for support; difficulty motor planning and following cues for technique. Able to step over after pt demonstrated marching in place with RW support. GB in tub too far on opposite wall to safely reach.        Functional Mobility    Distance in therapy gym    Functional Mobility [place] minimum assist (75% or more patient effort)     Safety/Cues verbal cues;proper use of assistive device;technique     Assistive Device walker, front-wheeled        Upper Body Dressing    Comment would benefit  from cervical collar education        Lower Body Dressing    [place] not tested     Comment will address tomorrow        Grooming    Comment edu on use of 2 cups for oral hygiene        Balance    Static Sitting Balance WFL     Dynamic Sitting Balance WFL     Sit to Stand Dynamic Balance mild impairment     Static Standing Balance mild impairment     Dynamic Standing Balance mild impairment                                    Education Documentation  Treatment Plan, taught by Nia Tobar OT at 10/8/2024  4:07 PM.  Learner: Patient  Readiness: Acceptance  Method: Explanation  Response: Verbalizes Understanding, Needs Reinforcement  Comment: OT role, POC, discharge        Session Outcome  Patient reclined, in chair, in therapy gym at end of session, all needs met, returned to room by volunteer. Nursing notified about patient's performance, patient's position, and patient's response to therapy/activity.    AM-PAC™ - ADL (Current Function)     Putting on/taking off regular lower body clothing 3 - A Little   Bathing 3 - A Little   Toileting 3 - A Little   Putting on/taking off regular upper body clothing 3 - A Little   Help for taking care of personal grooming 3 - A Little   Eating meals 4 - None   AM-PAC™ ADL Score 19      ASSESSMENT AND PLAN     Progress Summary  OT eval completed. Independent baseline and active. Spouse is supportive. Executive function deficits during session today. Min A-close S transfers, ambulation with RW. Continue OT with anticipation of d/c home with assist from spouse.    Patient/Family Therapy Goal Statement: to go home    OT Plan      Flowsheet Row Most Recent Value   Rehab Potential good, to achieve stated therapy goals at 10/08/2024 0947   Therapy Frequency daily at 10/08/2024 0947   Planned Therapy Interventions activity tolerance training, adaptive equipment training, BADL retraining, functional balance retraining, occupation/activity based interventions, patient/caregiver  education/training, transfer/mobility retraining at 10/08/2024 0947            OT Discharge Recommendations      Flowsheet Row Most Recent Value   OT Recommended Discharge Disposition home with assistance at 10/08/2024 0947   Anticipated Equipment Needs if Discharged Home (OT) none at 10/08/2024 0947                 OT Goals      Flowsheet Row Most Recent Value   Bed Mobility Goal 1    Activity/Assistive Device bed mobility activities, all at 10/08/2024 0947   [place] modified independence at 10/08/2024 0947   Time Frame by discharge at 10/08/2024 0947   Progress/Outcome goal ongoing at 10/08/2024 0947   Transfer Goal 1    Activity/Assistive Device toilet at 10/08/2024 0947   [place] modified independence at 10/08/2024 0947   Time Frame by discharge at 10/08/2024 0947   Progress/Outcome goal ongoing at 10/08/2024 0947   Transfer Goal 2    Activity/Assistive Device tub at 10/08/2024 0947   [place] supervision required at 10/08/2024 0947   Time Frame by discharge at 10/08/2024 0947   Progress/Outcome goal ongoing at 10/08/2024 0947   Dressing Goal 1    Activity/Adaptive Equipment lower body dressing at 10/08/2024 0947   [place] modified independence at 10/08/2024 0947   Time Frame by discharge at 10/08/2024 0947   Progress/Outcome goal ongoing at 10/08/2024 0947

## 2024-10-08 NOTE — PLAN OF CARE
Plan of Care Review  Plan of Care Reviewed With: patient  Progress: no change  Outcome Evaluation: Pt up to the chair with nursing. Seen by PT in the gym. Ambulated the halls with nursing. SWAPNIL remains in place. Dressing c/d/i.

## 2024-10-08 NOTE — PROGRESS NOTES
Pt without new complaints, doing well  AFVSS SWAPNIL in place  5/5 motor BLE and BUE all muscle groups  SILT BLE and BUE all sens dist  Dressing dry     A/P stable post op day 1  -pt, oob  -pain control  -abx/drain  -scds  -med management  -collar

## 2024-10-08 NOTE — PROGRESS NOTES
"     Inpatient Cardiology   Daily Progress Note       Overview:s/p cervical spine surgery. [facility] consulted for postop medical management     Assessment/Plan   Essential hypertension  Assessment & Plan  - BP readings improved POD #1  - Continue his usual dose of Coreg, Losartan, Hydralazine, Imdur and Norvasc   - Telemetry on 4 Pav  - Pain management    * Cervical spine arthritis with nerve pain  Overview  - s/p C2-C7 PDF on October 7, 2024.     Assessment & Plan  - DVT prophylaxis and pain management per ortho service  - Pulmonary toilet  - Ambulate  - Bowel regimen  - Hold all natural supplements. Resume post op when ok to do so per ortho      Diabetes mellitus type II, non insulin dependent (CMS/LTAC, located within [facility])  Assessment & Plan  - Monitor postop blood sugars. SSIC prn  - Continue Jardiance  - Pt states he takes \"metformin prn\" . Will add Metformin 500mg daily. He can discuss weaning to prn with his endocrinologist  - Resume Losartan as postop BP and renal function allow  - Daily statin     AAA (abdominal aortic aneurysm) (CMS/LTAC, located within [facility])  Assessment & Plan  - BP readings improved POD #1  - Continue his usual dose of Coreg, Losartan, Hydralazine, Norvasc ,Imdur     History of ischemic cardiomyopathy  Assessment & Plan  - Clinically well compensated.   - Previously on Entresto but stopped due to cost.   - Will resume GDMTs including Jardiance, Imdur/Hydralazine, Coreg and Losartan  - BP improved  with pain management and resumption of all his anti-hypertensives BP will be at goal by am    CAD (coronary artery disease)  Assessment & Plan  - Continue Tele  - No active CAD symptoms  - Continue his usual dose of Coreg, Norvasc, Losartan, Imdur and hydralazine  - Daily statin   - Resume daily aspirin when cleared by ortho    Neuropathy  Assessment & Plan  - Continue Neurontin  - Postop spinal management per ortho            Subjective   OOB sitting in chair. Wife visiting at bedside. He denies ha, dizziness, cp or sob. Tolerating " po. Home med list reviewed.       Current Medications:    Scheduled:   acetaminophen  975 mg oral q6h INT    amLODIPine  5 mg oral Nightly    aspirin  81 mg oral Daily    atorvastatin  40 mg oral Daily (6p)    bisacodyL  10 mg rectal Nightly    carvediloL  3.125 mg oral BID with meals    ceFAZolin  2 g intravenous q8h INT    dexamethasone  10 mg intravenous q8h INT    empagliflozin  10 mg oral Daily    hydrALAZINE  75 mg oral q8h INT    insulin lispro U-100  6-10 Units subcutaneous With meals & nightly    isosorbide mononitrate  30 mg oral Daily    losartan  50 mg oral Daily    metFORMIN  500 mg oral Daily with breakfast    pantoprazole  40 mg oral Daily    polyethylene glycol  17 g oral Daily    sennosides-docusate sodium  1 tablet oral BID    tamsulosin  0.4 mg oral Nightly       Infusions:   sodium chloride 0.9 %   125 mL/hr at 10/07/24 1851       PRN:    alum-mag hydroxide-simeth    glucose **OR** dextrose **OR** glucagon **OR** dextrose 50 % in water (D50)    diazePAM    diphenhydrAMINE    oxyCODONE **AND** morphine    ondansetron     Objective   Vital signs in last 24 hours:  Temp:  [36.1 °C (96.9 °F)-36.9 °C (98.4 °F)] 36.2 °C (97.1 °F)  Heart Rate:  [57-87] 68  Resp:  [9-40] 18  BP: (153-216)/() 156/54    Weights (last 5 days)       Date/Time Weight    10/07/24 1815 102 kg (225 lb 1.4 oz)            Intake/Output Summary (Last 24 hours) at 10/8/2024 0659  Last data filed at 10/8/2024 0600  24 Hour Net Input/Output from 7AM Yesterday   Intake 1800 ml   Output 2410 ml   Net -610 ml     Net IO Since Admission: -610 mL [10/08/24 1142]    Physical Exam  Vitals reviewed.   Constitutional:       Appearance: He is well-developed.   HENT:      Head: Normocephalic and atraumatic.      Mouth/Throat:      Mouth: Mucous membranes are moist.   Eyes:      General: No scleral icterus.  Neck:      Vascular: No JVD.      Comments: SWAPNIL serosangenous  Cardiovascular:      Rate and Rhythm: Normal rate and regular rhythm.       Pulses: Normal pulses.           Dorsalis pedis pulses are 2+ on the right side and 2+ on the left side.      Heart sounds: S1 normal and S2 normal. Murmur (III/VI VALENTE at [facility]) heard.   Pulmonary:      Breath sounds: No wheezing or rales.   Abdominal:      General: Bowel sounds are normal.      Palpations: Abdomen is soft.   Musculoskeletal:         General: Normal range of motion.      Cervical back: Rigidity present.   Skin:     General: Skin is warm and dry.      Coloration: Skin is pale.   Neurological:      Mental Status: He is alert and oriented to person, place, and time.   Psychiatric:         Attention and Perception: Attention normal.         Mood and Affect: Mood normal.         Speech: Speech normal.         Behavior: Behavior is cooperative.         Thought Content: Thought content normal.         Cognition and Memory: Cognition and memory normal.              Labs and Data:      Hematology    Results from last 7 days   Lab Units 10/08/24  0436   WBC K/uL 6.33   HEMOGLOBIN g/dL 11.9*   HEMATOCRIT % 35.4*   PLATELETS K/uL 107*       Chemistries    Results from last 7 days   Lab Units 10/08/24  0436   SODIUM mEQ/L 137   POTASSIUM mEQ/L 4.8   CHLORIDE mEQ/L 105   CREATININE mg/dL 1.3   BUN mg/dL 27*   CO2 mEQ/L 23   GLUCOSE mg/dL 208*   CALCIUM mg/dL 9.2         Endocrine    Lab Results   Component Value Date    HGBA1C 6.1 (H) 09/16/2024           Cardiology:    Telemetry  NSR                    ANTOINE Cochran  10/8/2024

## 2024-10-08 NOTE — HOSPITAL COURSE
Roberth is a 73 y.o. male admitted on 10/7/2024 with Cervical spondylosis with myelopathy [M47.12]  Cervical spinal stenosis [M48.02]  Cervical spine arthritis with nerve pain [M47.812, M79.2]  Paroxysmal atrial fibrillation (CMS/HCC) [I48.0]. Principal problem is Cervical spine arthritis with nerve pain.    Past Medical History  Roberth has a past medical history of Anemia, Hearing decreased, bilateral, Hypertension, Numbness and tingling, Sleep apnea, and Type 2 diabetes mellitus (CMS/HCC).    History of Present Illness  S/p C2-7 Posterior cervical decompression and fusion,instrumentation,allograft,autograft

## 2024-10-08 NOTE — PLAN OF CARE
Care Coordination Admission Assessment Note    General Information:  Readmission Within the last 30 days: no previous admission in last 30 days  Does patient have a : No  Patient-Specific Goals (include timeframe): To return home    Living Arrangements:  Arrived From:    Current Living Arrangements: home  People in Home: spouse  Home Accessibility: stairs within home (Group)  Living Arrangement Comments: Lives with his wife Bell in a 2 story home w/ no steps to enter. Has 1st floor powder room. Bedroom and full bath on 2nd floor.    Housing Stability and Utility Access (SDOH):  In the last 12 months, was there a time when you were not able to pay the mortgage or rent on time?: No  In the past 12 months, how many times have you moved?: 0  At any time in the past 12 months, were you homeless or living in a shelter (including now)?: No  In the past 12 months has the electric, gas, oil, or water company threatened to shut off services in your home?: No    Functional Status Prior to Admission:   Assistive Device/Animal Currently Used at Home: CPAP, other (see comments) (B/L  hearing aides and )  Functional Status Comments: Independent at baseline.  IADL Comments: Independent at baseline. He drives. He is retired.     Supports and Services:  Current Outpatient/Agency/Support Group: none  Type of Current Home Care Services:    History of home care episode or rehab stay: [facility] home care and [facility] rehab in [place] (about 3 years ago when he had open heart surgery)    Discharge Needs Assessment:   Concerns to be Addressed: discharge planning  Current Discharge Risk: physical impairment  Anticipated Changes Related to Illness: none    Patient/Family Anticipated Discharge Plan:  Patient/Family Anticipates Transition To: home with family  Patient/Family Anticipated Services at Transition: none    Connection to Community  Not applicable    Patient Choice:   Offered/Gave Vendor List:  no  Patient's Choice of Community Agency(s): N/A       Anticipated Discharge Plan:  Met with patient. Provided education and contact information for Care Coordination services.: yes  Anticipated Discharge Disposition: home with assistance     Transportation Needs (SDOH):  Transportation Concerns: none  Transportation Anticipated: family or friend will provide  Is Out of Hospital DNR needed at discharge?: no    In the past 12 months, has lack of transportation kept you from medical appointments or from getting medications?: No  In the past 12 months, has lack of transportation kept you from meetings, work, or from getting things needed for daily living?: No    Concerns - comments: CC met w/ patient and wife in room. Introduced myself and role of care coordination. Anticipated DC plan is to return home w/ assistance. DC pending drain removal. Patient's wife will drive him home. They are both in agreement with care plan.

## 2024-10-08 NOTE — OP NOTE
REPORT TYPE: Operative Note     DATE OF OPERATION: 10/7/2024     PREOPERATIVE DIAGNOSES:  C2 to C7 spondylosis, stenosis, spinal cord compression and myelopathy.     POSTOPERATIVE DIAGNOSES:   C2 to C7 spondylosis, stenosis, spinal cord compression and myelopathy.     PROCEDURES:  Bilateral cervical laminectomy C2 to C7, posterior cervical fusion C3 to C7, posterior cervical instrumentation C3 to C7; use of local autograft bone and demineralized allograft bone matrix.     SURGEON:  Krish Lucas MD     ASSISTANT:  Americo Avalos MD     ANESTHESIA:  General endotracheal anesthesia.        COMPLICATIONS:  None.     INSTRUMENTATION:  DePuy.    EBL: 150 mL     SPECIMENS:  None.     For a complete discussion of indication of procedure as well as the discussion I had with the patient in my office regarding risks, benefits and alternatives of treatment, please refer to my office notes and the consent forms, which were signed.     DESCRIPTION OF PROCEDURE:  The patient was identified in the holding area.  Operative site was marked.  The patient was taken to the operating room.  Neurophysiologic monitoring leads were applied.  General anesthesia was administered.  Hsu cranial   tongs were applied.  The patient was then prepped and draped in a prone position on a standard OR table with chest rolls.  Hsu cranial tongs were secured to the table attachment.  All bony and soft tissue protuberances were well padded throughout   the procedure.  Throughout the procedure, the patient had SCDs on his bilateral lower extremities.  Prior to skin incision, intravenous antibiotics were administered, and a formal timeout was performed.  At the end of procedure, sponge and needle counts were correct x2.     After prepping and draping, incision was made posteriorly over the C2 to C7 levels in the midline.  Dissection was carried out through subcutaneous tissue down to the level of the deep fascia.  Deep fascia was incised with  electrocautery.  In   subperiosteal fashion, the posterior elements of C2 to C7 were exposed.  Intraoperative x-ray was taken to confirm level.  Following this, soft tissue retractors were placed.  A large rongeur was used to remove the interspinous ligaments at C2-3 and   C6-7.  A high-speed bur was then used to drill laminectomy troughs at the junction of the  lateral masses bilaterally C3 to C6.  A #1 Kerrison rongeur was then used to complete the laminectomy troughs C3 to C6 bilaterally.  Towel clips were placed on spinous processes and   the spinous processes and lamina C3 to C6 were removed from the spinal canal. A #1 Kerrison rongeur was used to remove any remaining soft tissue attachments.  A high-speed bur and a #1 Kerrison rongeur was used to perform laminectomy at C2 and C7, removing the   superior one-half of the C7 lamina and the inferior one-half of the C2 lamina.  Bilateral foraminotomies were performed at C6-7 using a #1 Kerrison rongeur.  A high-speed bur was then used to make a starting point for lateral mass screws bilaterally at C3, C4, C5, C6 and pedicle screws   bilaterally at C7, followed by a drill and a tap and ball-tipped probe to ensure there were no breaches.  Following this, screws of appropriate width and length were placed bilaterally at C3, C4, C5, C6 and C7.  Secure fixation was noted with each screw   purchase.  All screws had good fixation.  The Hsu cranial tongs were relaxed and the neck was brought into lordotic position.  Precut and contoured rods were placed in the screws bilaterally C3 to C7.  Caps were placed over the rods into the screws   and finally tightened in place according to the 's recommendations.  Intraoperative AP and lateral x-rays were taken, which showed adequate position of instrumentation.  The adequacy of the decompression was confirmed with a nerve hook, which   was passed along the lateral recess and out of the foramen bilaterally C2 to  C7.  There was no remaining stenosis noted.  The thecal sac was fully decompressed.  A high-speed bur was then used to decorticate the posterior elements including lateral masses   and facet joints bilaterally C3 to C7.  Local autograft bone from laminectomy was morselized in a bone mill combined with demineralized allograft bone matrix.  This bone graft combination was packed out of the decorticated bony surfaces bilaterally C3   to C7.  Hemostasis was obtained using electrocautery, bipolar and Arnoldo Seal.  Under Valsalva maneuver, there was no significant bleeding noted.  No evidence of any spinal fluid leakage.  The wound was copiously irrigated with antibiotic-impregnated   solution for a total of 4 liters of irrigation.  Hemostasis was felt to be adequate.  A drain was placed deep to the deep fascia.  Deep fascia was closed in interrupted fashion.  Subcutaneous tissue was closed in interrupted fashion.  The skin was closed   in running fashion followed by a dry dressing and tape.  The patient was turned supine on the hospital bed.  Hsu cranial tongs were removed.  The patient was placed in a cervical collar, extubated, transferred to the PACU in stable condition.    There were no complications.        Krish Lucas MD

## 2024-10-08 NOTE — PLAN OF CARE
Problem: Adult Inpatient Plan of Care  Goal: Plan of Care Review  Outcome: Progressing  Flowsheets (Taken 10/8/2024 1608)  Progress: improving  Outcome Evaluation: OT laura completed  Plan of Care Reviewed With: patient     Problem: Self-Care Deficit  Goal: Improved Ability to Complete Activities of Daily Living  Outcome: Progressing

## 2024-10-08 NOTE — PROGRESS NOTES
Orthopaedic Spine Surgery Postoperative Check    [S]  Pain well controlled, resting comfortably    [O]    Vitals:    10/07/24 2142   BP: (!) 160/53   Pulse: 82   Resp:    Temp:    SpO2:          Physical Exam    Dressing in place, clean, dry, intact.  Drain in place    RUE  Neurovascular: SILT C4-T1 distribution.  Motor: Deltoid 5/5, biceps 5/5, triceps 5/5, wrist extensors 5/5, hand intrinsics 5/5    LUE  Neurovascular: SILT C4-T1 distribution.  Motor: Deltoid 5/5, biceps 5/5, triceps 5/5, wrist extensors 5/5, hand intrinsics 5/5    RLE:  Neurovascular: SILT L2-S1 distibution.  Motor: IP 5/5, Quad 5/5, TA 5/5, EHL 5/5, GS 5/5    LLE:  Neurovascular: SILT L2-S1 distibution.  Motor: IP 5/5, Quad 5/5, TA 5/5, EHL 5/5, GS 5/5    AP: 73 y.o. male POD 0 s/p C2-7 PCDF    -- Pain control  -- WB: WBAT  -- DVT: SCDs  -- Advance diet as tolerated  -- Monitor SWAPNIL drain output  -- PT/OT  -- Monitor neurovascular status      José Miguel Betancourt MD  Orthopaedic Surgery, PGY-5

## 2024-10-08 NOTE — PLAN OF CARE
Problem: Adult Inpatient Plan of Care  Goal: Plan of Care Review  Outcome: Progressing  Flowsheets (Taken 10/8/2024 1324)  Progress: improving  Outcome Evaluation: PT eval completed.  Plan of Care Reviewed With: patient  Goal: Patient-Specific Goal (Individualized)  Outcome: Progressing  Flowsheets (Taken 10/8/2024 1324)  Patient/Family-Specific Goals (Include Timeframe): to go home at d/c     Problem: Mobility Impairment  Goal: Optimal Mobility  Outcome: Progressing

## 2024-10-08 NOTE — PROGRESS NOTES
Physical Therapy -  Initial Evaluation     Patient: Roberth Amato  Location: [place] [address]  MRN: [id number]  Today's date: 10/8/2024    HISTORY OF PRESENT ILLNESS     Roberth is a 73 y.o. male admitted on 10/7/2024 with Cervical spondylosis with myelopathy [M47.12]  Cervical spinal stenosis [M48.02]  Cervical spine arthritis with nerve pain [M47.812, M79.2]  Paroxysmal atrial fibrillation (CMS/HCC) [I48.0]. Principal problem is Cervical spine arthritis with nerve pain.    Past Medical History  Roebrth has a past medical history of Anemia, Hearing decreased, bilateral, Hypertension, Numbness and tingling, Sleep apnea, and Type 2 diabetes mellitus (CMS/HCC).    History of Present Illness  S/p C2-7 Posterior cervical decompression and fusion,instrumentation,allograft,autograft    PRIOR LEVEL OF FUNCTION AND LIVING ENVIRONMENT     Prior Level of Function      Flowsheet Row Most Recent Value   Dominant Hand right   Ambulation independent   Transferring independent   Toileting independent   Bathing independent   Dressing independent   Eating independent   IADLs independent   Driving/Transportation    Communication understands/communicates without difficulty   Swallowing swallows foods/liquids without difficulty   Prior Level of Function Comment typically ind w/ ADLs, IADLs, walks 2 miles every other day for exercise, no AD, drives, is retired practice management for physician groups   Assistive Device Currently Used at Home CPAP, other (see comments)  [B/L  hearing aides and ]             Prior Living Environment      Flowsheet Row Most Recent Value   People in Home spouse   Current Living Arrangements home   Home Accessibility stairs within home (Group)   Living Environment Comment Lives in a 2 SH w/ wife, 0 JOSE, AR on 1st, 18 steps to 2nd fl bed/bath w/ tub/shower w/ GB (can borrow a shower chair)          VITALS AND PAIN     PT Vitals      Date/Time Pulse SpO2 Pt Activity  O2 Therapy BP BP Location BP Method Pt Position [facility]   10/08/24 0937 58 96 % At rest None (Room air) 140/50 Right upper arm Automatic Sitting MLP   10/08/24 0954 63 97 % At rest None (Room air) 151/50 Right upper arm Automatic Sitting MLP          PT Pain      Date/Time Pain Type Side/Orientation Location Rating: Rest Rating: Activity Description [facility]   10/08/24 0937 Pain Assessment posterior neck 7 7 constant MLP             Objective   OBJECTIVE     Start time:  0935  End time:  1000  Session Length: 25 min  Mode of Treatment: co-treatment  Reason for co-treatment: partial co tx to expedite d/c    General Observations  Patient received in chair, upright. He was agreeable to therapy, no issues or concerns identified by nurse prior to session. NAD, hard collar on    Precautions: spinal, brace worn at all times (hard collar at all times)        Services  Do You Speak a Language Other Than English at Home?: no  Is an  Needed/Used?: No      PT Eval and Treat - 10/08/24 0935          Cognition    Orientation Status oriented x 4     Affect/Mental Status WFL     Follows Commands follows three-step commands     Cognitive Function WFL        Vision Assessment/Intervention    Vision Assessment corrective lenses for reading        Hearing Assessment    Hearing Status WFL        Sensory Assessment    Sensory Assessment --   hx of neuropathy in BLE, BUE       Lower Extremity Strength    Hip, Left (Strength) at least 3+/5     Knee, Left (Strength) at least 3+/5     Ankle, Left (Strength) at least 3+/5     Hip, Right (Strength) at least 3+/5     Knee, Right (Strength) at least 3+/5     Ankle, Right (Strength) at least 3+/5        Mobility Belt    Mobility Belt Used During Session yes        Sit/Stand Transfer    Surface chair with arm rests     [place] 1 person assist;moderate assist (50-74% patient effort);minimum assist (75% or more patient effort)     Safety/Cues maintaining center of gravity over base  of support;sequencing;proper use of assistive device;technique;verbal cues;initiation     Assistive Device walker, front-wheeled     Transfer Comments had LOB upon standing, needed mod Ax1 to maintain standing balance. PT had patient sit, then stood again/used RW upon standing for support/balance        Car Transfer    Transfer Technique sit-stand;stand-sit     Haakon close supervision     Safety/Cues increased time to complete     Assistive Device walker, front-wheeled        Gait Training    [place], Gait minimum assist (75% or more patient effort);moderate assist (50-74% patient effort)     Safety/Cues increased time to complete     Assistive Device walker, front-wheeled     Distance in Feet 50 feet   x2    Pattern step-to     Deviations/Abnormal Patterns gait speed decreased;weight shifting decreased;stride length decreased;step length decreased     Comment generalized weakness noted, decreased foot clearance        Stairs Training    [place], Stairs minimum assist (75% or more patient effort);1 person assist     Assistive Device railing;cane, straight     Number of Stairs 4     Ascending Stairs Technique step-to-step     Descending Stairs Technique step-to-step     Comment used SPC for support, sensory input for patient        Balance    Static Sitting Balance WFL     Dynamic Sitting Balance WFL     Sit to Stand Dynamic Balance mild impairment;supported     Static Standing Balance mild impairment;supported     Dynamic Standing Balance mild impairment;supported        Impairments/Safety Issues    Impairments Affecting Function range of motion (ROM);strength;balance;endurance/activity tolerance     Functional Endurance fair     Safety Issues Affecting Function insight into deficits/self-awareness                                    Education Documentation  Joint Mobility/Strength, taught by Dana Genao, PT at 10/8/2024  1:25 PM.  Learner: Patient  Readiness: Acceptance  Method: Explanation,  Demonstration  Response: Verbalizes Understanding, Demonstrated Understanding  Comment: PT provided TA and GT education.    Home Safety, taught by Dana Genao PT at 10/8/2024  1:25 PM.  Learner: Patient  Readiness: Acceptance  Method: Explanation, Demonstration  Response: Verbalizes Understanding, Demonstrated Understanding  Comment: PT provided TA and GT education.    Energy Conservation, taught by Dana Genao PT at 10/8/2024  1:25 PM.  Learner: Patient  Readiness: Acceptance  Method: Explanation, Demonstration  Response: Verbalizes Understanding, Demonstrated Understanding  Comment: PT provided TA and GT education.    Assistive/Adaptive Devices, taught by Dana Genao PT at 10/8/2024  1:25 PM.  Learner: Patient  Readiness: Acceptance  Method: Explanation, Demonstration  Response: Verbalizes Understanding, Demonstrated Understanding  Comment: PT provided TA and GT education.        Session Outcome  Patient supine, reclined, in therapy gym at end of session, all needs met, returned to room by therapy aide. Nursing notified about patient's position, patient's response to therapy/activity, and patient's performance.    AM-PAC™ - Mobility (Current Function)     Turning form your back to your side while in flat bed without using bedrails 3 - A Little   Moving from lying on your back to sitting on the side of a flat bed without using bedrails 3 - A Little   Moving to and from a bed to a chair 3 - A Little   Standing up from a chair using your arms 3 - A Little   To walk in a hospital room 3 - A Little   Climbing 3-5 steps with a railing 3 - A Little   AM-PAC™ Mobility Score 18      ASSESSMENT AND PLAN     Progress Summary  Patient s/p C2-7 PCDF, hard collar at all times. Required modAx1 for initially sit to stand due to LOB bwds, needing modAx1 to maintain standing, progressed to minAx1 for transfers during session. Amb w/ CS/Huber RW. Will need RW for d/c. PT will continue to follow. PT recommends home w/  (A).    Patient/Family Therapy Goals Statement: to progress mobility    PT Plan      Flowsheet Row Most Recent Value   Rehab Potential good, to achieve stated therapy goals at 10/08/2024 0935   Therapy Frequency 5 times/wk at 10/08/2024 0935   Planned Therapy Interventions balance training, bed mobility training, gait training, strengthening, stair training, transfer training at 10/08/2024 0935            PT Discharge Recommendations      Flowsheet Row Most Recent Value   PT Recommended Discharge Disposition home with assistance at 10/08/2024 0935   Anticipated Equipment Needs if Discharged Home (PT) walker, front-wheeled at 10/08/2024 0935                 PT Goals      Flowsheet Row Most Recent Value   Bed Mobility Goal 1    Activity/Assistive Device bed mobility activities, all at 10/08/2024 0935   Becker modified independence at 10/08/2024 0935   Time Frame by discharge at 10/08/2024 0935   Progress/Outcome goal ongoing at 10/08/2024 0935   Transfer Goal 1    Activity/Assistive Device all transfers, walker, front-wheeled at 10/08/2024 0935   Becker modified independence at 10/08/2024 0935   Time Frame by discharge at 10/08/2024 0935   Progress/Outcome goal ongoing at 10/08/2024 0935   Gait Training Goal 1    Activity/Assistive Device gait (walking locomotion), walker, front-wheeled at 10/08/2024 0935   Becker modified independence at 10/08/2024 0935   Distance 150 at 10/08/2024 0935   Time Frame by discharge at 10/08/2024 0935   Progress/Outcome goal ongoing at 10/08/2024 0935   Stairs Goal 1    Activity/Assistive Device stairs, all skills, cane, straight, using handrail, right at 10/08/2024 0935   Becker modified independence at 10/08/2024 0935   Number of Stairs 4 at 10/08/2024 0935   Time Frame by discharge at 10/08/2024 0935   Progress/Outcome goal ongoing at 10/08/2024 0935

## 2024-10-09 PROBLEM — N17.9 AKI (ACUTE KIDNEY INJURY) (CMS/HCC): Status: ACTIVE | Noted: 2024-10-09

## 2024-10-09 LAB
ANION GAP SERPL CALC-SCNC: 9 MEQ/L (ref 3–15)
BASOPHILS # BLD: 0.01 K/UL (ref 0.01–0.1)
BASOPHILS NFR BLD: 0.1 %
BUN SERPL-MCNC: 41 MG/DL (ref 7–25)
CALCIUM SERPL-MCNC: 9.4 MG/DL (ref 8.6–10.3)
CHLORIDE SERPL-SCNC: 104 MEQ/L (ref 98–107)
CO2 SERPL-SCNC: 23 MEQ/L (ref 21–31)
CREAT SERPL-MCNC: 1.7 MG/DL (ref 0.7–1.3)
DIFFERENTIAL METHOD BLD: ABNORMAL
EGFRCR SERPLBLD CKD-EPI 2021: 41.5 ML/MIN/1.73M*2
EOSINOPHIL # BLD: 0 K/UL (ref 0.04–0.54)
EOSINOPHIL NFR BLD: 0 %
ERYTHROCYTE [DISTWIDTH] IN BLOOD BY AUTOMATED COUNT: 11.9 % (ref 11.6–14.4)
GLUCOSE BLD-MCNC: 139 MG/DL (ref 70–99)
GLUCOSE BLD-MCNC: 139 MG/DL (ref 70–99)
GLUCOSE BLD-MCNC: 145 MG/DL (ref 70–99)
GLUCOSE BLD-MCNC: 151 MG/DL (ref 70–99)
GLUCOSE SERPL-MCNC: 159 MG/DL (ref 70–99)
HCT VFR BLD AUTO: 30.4 % (ref 40.1–51)
HGB BLD-MCNC: 10.4 G/DL (ref 13.7–17.5)
IMM GRANULOCYTES # BLD AUTO: 0.03 K/UL (ref 0–0.08)
IMM GRANULOCYTES NFR BLD AUTO: 0.3 %
LYMPHOCYTES # BLD: 0.71 K/UL (ref 1.2–3.5)
LYMPHOCYTES NFR BLD: 7.3 %
MCH RBC QN AUTO: 30.6 PG (ref 28–33.2)
MCHC RBC AUTO-ENTMCNC: 34.2 G/DL (ref 32.2–36.5)
MCV RBC AUTO: 89.4 FL (ref 83–98)
MONOCYTES # BLD: 0.6 K/UL (ref 0.3–1)
MONOCYTES NFR BLD: 6.2 %
NEUTROPHILS # BLD: 8.38 K/UL (ref 1.7–7)
NEUTS SEG NFR BLD: 86.1 %
NRBC BLD-RTO: 0 %
PLATELET # BLD AUTO: 121 K/UL (ref 150–350)
PMV BLD AUTO: 9.9 FL (ref 9.4–12.4)
POCT TEST: ABNORMAL
POTASSIUM SERPL-SCNC: 5.1 MEQ/L (ref 3.5–5.1)
RBC # BLD AUTO: 3.4 M/UL (ref 4.5–5.8)
SODIUM SERPL-SCNC: 136 MEQ/L (ref 136–145)
WBC # BLD AUTO: 9.73 K/UL (ref 3.8–10.5)

## 2024-10-09 PROCEDURE — 63600000 HC DRUGS/DETAIL CODE: Mod: JZ | Performed by: NURSE PRACTITIONER

## 2024-10-09 PROCEDURE — 25800000 HC PHARMACY IV SOLUTIONS: Performed by: NURSE PRACTITIONER

## 2024-10-09 PROCEDURE — 63700000 HC SELF-ADMINISTRABLE DRUG: Performed by: NURSE PRACTITIONER

## 2024-10-09 PROCEDURE — 97535 SELF CARE MNGMENT TRAINING: CPT | Mod: GO

## 2024-10-09 PROCEDURE — 80048 BASIC METABOLIC PNL TOTAL CA: CPT | Performed by: NURSE PRACTITIONER

## 2024-10-09 PROCEDURE — 97530 THERAPEUTIC ACTIVITIES: CPT | Mod: GP,CQ

## 2024-10-09 PROCEDURE — 85025 COMPLETE CBC W/AUTO DIFF WBC: CPT | Performed by: NURSE PRACTITIONER

## 2024-10-09 PROCEDURE — 97116 GAIT TRAINING THERAPY: CPT | Mod: GP,CQ

## 2024-10-09 PROCEDURE — 20600000 HC ROOM AND CARE INTERMEDIATE/TELEMETRY

## 2024-10-09 PROCEDURE — 36415 COLL VENOUS BLD VENIPUNCTURE: CPT | Performed by: NURSE PRACTITIONER

## 2024-10-09 PROCEDURE — 99233 SBSQ HOSP IP/OBS HIGH 50: CPT | Mod: FS | Performed by: INTERNAL MEDICINE

## 2024-10-09 RX ORDER — AMLODIPINE BESYLATE 10 MG/1
10 TABLET ORAL NIGHTLY
Status: DISCONTINUED | OUTPATIENT
Start: 2024-10-09 | End: 2024-10-11

## 2024-10-09 RX ORDER — CARVEDILOL 3.12 MG/1
3.12 TABLET ORAL 2 TIMES DAILY WITH MEALS
Status: DISCONTINUED | OUTPATIENT
Start: 2024-10-09 | End: 2024-10-10

## 2024-10-09 RX ADMIN — EMPAGLIFLOZIN 10 MG: 10 TABLET, FILM COATED ORAL at 08:51

## 2024-10-09 RX ADMIN — ACETAMINOPHEN 975 MG: 325 TABLET ORAL at 00:41

## 2024-10-09 RX ADMIN — ACETAMINOPHEN 975 MG: 325 TABLET ORAL at 12:26

## 2024-10-09 RX ADMIN — CEFAZOLIN 2 G: 2 INJECTION, POWDER, FOR SOLUTION INTRAMUSCULAR; INTRAVENOUS at 21:39

## 2024-10-09 RX ADMIN — POLYETHYLENE GLYCOL 3350 17 G: 17 POWDER, FOR SOLUTION ORAL at 08:50

## 2024-10-09 RX ADMIN — METFORMIN HYDROCHLORIDE 500 MG: 500 TABLET ORAL at 08:51

## 2024-10-09 RX ADMIN — TAMSULOSIN HYDROCHLORIDE 0.4 MG: 0.4 CAPSULE ORAL at 21:40

## 2024-10-09 RX ADMIN — OXYCODONE HYDROCHLORIDE 10 MG: 5 TABLET ORAL at 12:31

## 2024-10-09 RX ADMIN — SENNOSIDES AND DOCUSATE SODIUM 1 TABLET: 8.6; 5 TABLET ORAL at 21:39

## 2024-10-09 RX ADMIN — CEFAZOLIN 2 G: 2 INJECTION, POWDER, FOR SOLUTION INTRAMUSCULAR; INTRAVENOUS at 04:15

## 2024-10-09 RX ADMIN — ACETAMINOPHEN 975 MG: 325 TABLET ORAL at 19:02

## 2024-10-09 RX ADMIN — OXYCODONE HYDROCHLORIDE 5 MG: 5 TABLET ORAL at 06:32

## 2024-10-09 RX ADMIN — CARVEDILOL 3.12 MG: 3.12 TABLET, FILM COATED ORAL at 10:30

## 2024-10-09 RX ADMIN — LOSARTAN POTASSIUM 50 MG: 50 TABLET ORAL at 08:51

## 2024-10-09 RX ADMIN — ATORVASTATIN CALCIUM 40 MG: 40 TABLET, FILM COATED ORAL at 17:00

## 2024-10-09 RX ADMIN — AMLODIPINE BESYLATE 10 MG: 10 TABLET ORAL at 21:39

## 2024-10-09 RX ADMIN — ISOSORBIDE MONONITRATE 30 MG: 30 TABLET, EXTENDED RELEASE ORAL at 08:51

## 2024-10-09 RX ADMIN — PANTOPRAZOLE SODIUM 40 MG: 40 TABLET, DELAYED RELEASE ORAL at 08:50

## 2024-10-09 RX ADMIN — HYDRALAZINE HYDROCHLORIDE 75 MG: 25 TABLET ORAL at 13:48

## 2024-10-09 RX ADMIN — ASPIRIN 81 MG: 81 TABLET, COATED ORAL at 08:50

## 2024-10-09 RX ADMIN — SENNOSIDES AND DOCUSATE SODIUM 1 TABLET: 8.6; 5 TABLET ORAL at 08:51

## 2024-10-09 RX ADMIN — CEFAZOLIN 2 G: 2 INJECTION, POWDER, FOR SOLUTION INTRAMUSCULAR; INTRAVENOUS at 12:28

## 2024-10-09 RX ADMIN — HYDRALAZINE HYDROCHLORIDE 75 MG: 25 TABLET ORAL at 21:40

## 2024-10-09 RX ADMIN — HYDRALAZINE HYDROCHLORIDE 75 MG: 25 TABLET ORAL at 05:02

## 2024-10-09 RX ADMIN — OXYCODONE HYDROCHLORIDE 10 MG: 5 TABLET ORAL at 17:00

## 2024-10-09 RX ADMIN — OXYCODONE HYDROCHLORIDE 10 MG: 5 TABLET ORAL at 21:40

## 2024-10-09 ASSESSMENT — COGNITIVE AND FUNCTIONAL STATUS - GENERAL
HELP NEEDED FOR BATHING: 3 - A LITTLE
STANDING UP FROM CHAIR USING ARMS: 4 - NONE
WALKING IN HOSPITAL ROOM: 4 - NONE
WALKING IN HOSPITAL ROOM: 4 - NONE
CLIMB 3 TO 5 STEPS WITH RAILING: 4 - NONE
DRESSING REGULAR UPPER BODY CLOTHING: 4 - NONE
WALKING IN HOSPITAL ROOM: 3 - A LITTLE
MOVING TO AND FROM BED TO CHAIR: 4 - NONE
MOVING TO AND FROM BED TO CHAIR: 4 - NONE
AFFECT: WFL
HELP NEEDED FOR PERSONAL GROOMING: 4 - NONE
CLIMB 3 TO 5 STEPS WITH RAILING: 3 - A LITTLE
DRESSING REGULAR LOWER BODY CLOTHING: 4 - NONE
MOVING TO AND FROM BED TO CHAIR: 3 - A LITTLE
TOILETING: 4 - NONE
CLIMB 3 TO 5 STEPS WITH RAILING: 4 - NONE
STANDING UP FROM CHAIR USING ARMS: 4 - NONE
STANDING UP FROM CHAIR USING ARMS: 3 - A LITTLE
EATING MEALS: 4 - NONE

## 2024-10-09 NOTE — PROGRESS NOTES
"     Inpatient Cardiology   Daily Progress Note       Overview: s/p cervical spine surgery. [facility] consulted for postop medical/CV management  Mild MICHEAL postop---holding ARB, SGLT2i and Metformin pending am bmp     Assessment/Plan   MICHEAL (acute kidney injury) (CMS/[facility])  Assessment & Plan  - Cr 1.7 POD #2  - Holding losartan, Jardiance and Metformin  - Encourage po fluids today  - BMP in am    Essential hypertension  Assessment & Plan  - BP readings improved but not at goal  - Continue his usual dose of Coreg, Losartan, Hydralazine, Imdur . Will increase Norvasc to 10mg daily  - Telemetry on 4 Pav  - Pain management    * Cervical spine arthritis with nerve pain  Overview  - s/p C2-C7 PDF on October 7, 2024.     Assessment & Plan  - DVT prophylaxis and pain management per ortho service  - Pulmonary toilet  - Ambulate  - Bowel regimen  - Hold all natural supplements. Resume post op when ok to do so per ortho      Diabetes mellitus type II, non insulin dependent (CMS/[facility])  Assessment & Plan  - Monitor postop blood sugars. SSIC prn  - Continue Jardiance  - Pt states he takes \"metformin prn\" .  Metformin 500mg daily. He can discuss weaning to prn with his endocrinologist  - Resume Losartan as postop BP and renal function allow  - Daily statin     AAA (abdominal aortic aneurysm) (CMS/[facility])  Assessment & Plan  - BP readings improved POD #1  - Continue his usual dose of Coreg, Losartan, Hydralazine and Imdur . Norvasc to 10mg daily    History of ischemic cardiomyopathy  Assessment & Plan  - Clinically well compensated.   - Previously on Entresto but stopped due to cost.   - Will resume GDMTs including Jardiance, Imdur/Hydralazine, Coreg and Losartan  - BP improved  with pain management and resumption of all his anti-hypertensives     CAD (coronary artery disease)  Assessment & Plan  - Continue Tele  - No active CAD symptoms  - Continue his usual dose of Coreg, Losartan, Imdur and hydralazine. Norvasc increased to 10mg   - " Daily statin   - Resume daily aspirin when cleared by ortho    Neuropathy  Assessment & Plan  - Continue Neurontin  - Postop spinal management per ortho            Subjective   Feels well. Ambulating in [place] independently. Denies ha,dizziness, cp or sob.       Current Medications:    Scheduled:   acetaminophen  975 mg oral q6h INT    amLODIPine  10 mg oral Nightly    aspirin  81 mg oral Daily    atorvastatin  40 mg oral Daily (6p)    bisacodyL  10 mg rectal Nightly    carvediloL  3.125 mg oral BID with meals    ceFAZolin  2 g intravenous q8h INT    [Provider Managed Hold] empagliflozin  10 mg oral Daily    hydrALAZINE  75 mg oral q8h INT    insulin lispro U-100  6-10 Units subcutaneous With meals & nightly    isosorbide mononitrate  30 mg oral Daily    [Provider Managed Hold] losartan  50 mg oral Daily    [Provider Managed Hold] metFORMIN  500 mg oral Daily with breakfast    pantoprazole  40 mg oral Daily    polyethylene glycol  17 g oral Daily    sennosides-docusate sodium  1 tablet oral BID    tamsulosin  0.4 mg oral Nightly       Infusions:      PRN:    alum-mag hydroxide-simeth    glucose **OR** dextrose **OR** glucagon **OR** dextrose 50 % in water (D50)    diazePAM    diphenhydrAMINE    melatonin    ondansetron    oxyCODONE **AND** [] morphine     Objective   Vital signs in last 24 hours:  Temp:  [36.3 °C (97.4 °F)-37.2 °C (98.9 °F)] 36.9 °C (98.4 °F)  Heart Rate:  [50-61] 50  Resp:  [16-20] 18  BP: (146-181)/(43-75) 156/70    Weights (last 5 days)       Date/Time Weight    10/07/24 1815 102 kg (225 lb 1.4 oz)            Intake/Output Summary (Last 24 hours) at 10/9/2024 0659  Last data filed at 10/9/2024 0620  24 Hour Net Input/Output from 7AM Yesterday   Intake 10 ml   Output 460 ml   Net -450 ml     Net IO Since Admission: -1,060 mL [10/09/24 1149]    Physical Exam  Vitals reviewed.   Constitutional:       Appearance: He is well-developed.   HENT:      Head: Normocephalic and atraumatic.       Mouth/Throat:      Mouth: Mucous membranes are moist.   Eyes:      General: No scleral icterus.  Neck:      Vascular: No JVD.      Comments: Cervical collar  SWAPNIL serosangenous  Cardiovascular:      Rate and Rhythm: Regular rhythm. Bradycardia present.      Pulses: Normal pulses.           Dorsalis pedis pulses are 2+ on the right side and 2+ on the left side.      Heart sounds: S1 normal and S2 normal. No murmur heard.  Pulmonary:      Breath sounds: No wheezing or rales.   Abdominal:      General: Bowel sounds are normal.      Palpations: Abdomen is soft.   Musculoskeletal:         General: Normal range of motion.      Cervical back: Tenderness present.   Skin:     General: Skin is warm and dry.   Neurological:      Mental Status: He is alert and oriented to person, place, and time.   Psychiatric:         Attention and Perception: Attention normal.         Mood and Affect: Mood normal.         Speech: Speech normal.         Behavior: Behavior is cooperative.         Thought Content: Thought content normal.         Cognition and Memory: Cognition and memory normal.              Labs and Data:      Hematology    Results from last 7 days   Lab Units 10/09/24  0528 10/08/24  0436   WBC K/uL 9.73 6.33   HEMOGLOBIN g/dL 10.4* 11.9*   HEMATOCRIT % 30.4* 35.4*   PLATELETS K/uL 121* 107*       Chemistries    Results from last 7 days   Lab Units 10/09/24  0528 10/08/24  0436   SODIUM mEQ/L 136 137   POTASSIUM mEQ/L 5.1 4.8   CHLORIDE mEQ/L 104 105   CREATININE mg/dL 1.7* 1.3   BUN mg/dL 41* 27*   CO2 mEQ/L 23 23   GLUCOSE mg/dL 159* 208*   CALCIUM mg/dL 9.4 9.2           Endocrine    Lab Results   Component Value Date    HGBA1C 6.1 (H) 09/16/2024         Cardiology:    Telemetry  SB                    ANTOINE Cochran  10/9/2024

## 2024-10-09 NOTE — PROGRESS NOTES
Occupational Therapy -  Daily Treatment/Progress Note     Patient: Roberth Amato  Location: [place] [address]  MRN: [id number]  Today's date: 10/9/2024    HISTORY OF PRESENT ILLNESS     Roberth is a 73 y.o. male admitted on 10/7/2024 with Cervical spondylosis with myelopathy [M47.12]  Cervical spinal stenosis [M48.02]  Cervical spine arthritis with nerve pain [M47.812, M79.2]  Paroxysmal atrial fibrillation (CMS/HCC) [I48.0]. Principal problem is Cervical spine arthritis with nerve pain.    Past Medical History  Roberth has a past medical history of Anemia, Hearing decreased, bilateral, Hypertension, Numbness and tingling, Sleep apnea, and Type 2 diabetes mellitus (CMS/HCC).    History of Present Illness  S/p C2-7 Posterior cervical decompression and fusion,instrumentation,allograft,autograft    PRIOR LEVEL OF FUNCTION AND LIVING ENVIRONMENT     Prior Level of Function      Flowsheet Row Most Recent Value   Dominant Hand right   Ambulation independent   Transferring independent   Toileting independent   Bathing independent   Dressing independent   Eating independent   IADLs independent   Driving/Transportation    Communication understands/communicates without difficulty   Swallowing swallows foods/liquids without difficulty   Prior Level of Function Comment typically ind w/ ADLs, IADLs, walks 2 miles every other day for exercise, no AD, drives, is retired practice management for physician groups   Assistive Device Currently Used at Home CPAP, other (see comments)  [B/L  hearing aides and ]             Prior Living Environment      Flowsheet Row Most Recent Value   People in Home spouse   Current Living Arrangements home   Home Accessibility stairs within home (Group)   Living Environment Comment Lives in a 2 SH w/ wife, 0 JOSE, NH on 1st, 18 steps to 2nd fl bed/bath w/ tub/shower w/ GB (can borrow a shower chair)          Occupational Profile      Flowsheet Row Most Recent  Value   Performance Patterns walks 1-2 miles a week   Environmental Supports and Barriers reports spouse is supportive and can assist as needed          VITALS AND PAIN      Therapy Pain/Vitals - 10/09/24 0912          Pain/Comfort/Sleep    Pain Charting Type Pain Assessment     Preferred Pain Scale word (verbal rating pain scale)     Word Pain Rating: Rest 4 - moderate pain     Word Pain Rating: Activity 4 - moderate pain     Pain Side/Orientation posterior     Pain Body Location neck     Pain Management Interventions position adjusted        Vitals    Pulse 51     SpO2 97 %     Patient Activity At rest     Oxygen Therapy None (Room air)     /52     BP Location Left upper arm     BP Method Automatic     Patient Position Sitting                       Objective   OBJECTIVE     Start time:  0913  End time:  0930  Session Length: 17 min  Mode of Treatment: co-treatment  Reason for co-treatment: co treat to expedite possible d/c    General Observations  Patient received in chair, upright, in therapy gym. He was agreeable to therapy, no issues or concerns identified by nurse prior to session.      Precautions: spinal, brace worn at all times, fall (hard cervical collar at all times)       Limitations/Impairments: hearing      OT Eval and Treat - 10/09/24 0913          Cognition    Orientation Status oriented x 4     Affect/Mental Status WFL     Follows Commands WFL     Cognitive Function executive function deficit     Executive Function Deficit minimal deficit;judgment     Comment, Cognition Noted improved executive functioning today compared to yesterday. Eager to improve mobility and independence. Was asking about performing PT for neck since his neck mobility is decreased currently. Edu on importance of keeping neck immobilized for now.        Bed Mobility    Jack not tested     Comment addressed by PTA        Mobility Belt    Mobility Belt Used During Session yes        Sit/Stand Transfer    Surface  chair with arm rests;mat     [place] modified independence     Assistive Device walker, front-wheeled        Toilet Transfer    Transfer Technique sit/stand     [place] modified independence     Assistive Device walker, front-wheeled;grab bars/safety frame     Comment use of GB with standard height toilet to simulate GB support near toilet at home        Shower/Tub Transfer    Transfer Type Tub     Transfer Technique step over, right entry     [place] supervision     Safety/Cues verbal cues;hand placement     Assistive Device walker, front-wheeled     Comment overall improvement in ease of transfer and safety with tub transfer today        Functional Mobility    Distance in therapy gym     Functional Mobility [place] modified independence     Assistive Device walker, front-wheeled     Functional Mobility Comments improved balance today        Bathing    Comment edu on spinal precautions while bathing, pt receptive        Upper Body Dressing    Comment pt reports spouse will manage hard cervical collar at discharge        Lower Body Dressing    Tasks doff;don;socks     [place] independent     Position supported sitting     Adaptive Equipment none     Comment figure 4 technique bilaterally        Grooming    Comment re-edu on use of 2 cups for oral hygiene. Pt receptive.        Balance    Static Sitting Balance WFL     Dynamic Sitting Balance WFL     Sit to Stand Dynamic Balance WFL;supported     Static Standing Balance WFL;supported     Dynamic Standing Balance WFL;supported                            Orthosis Neck Cervical Collar (Active)   Date Obtained/Time Obtained: 10/08/24 1500   Location: Neck  Type: Cervical Collar  Features: Hard  Wearing Schedule: wear at all times (remove only for hygiene and skin inspection)     Orthosis Neck Cervical Collar (Active)   Skin Assessment no redness;no breakdown 10/09/24 0830         Session Outcome  Patient reclined, in chair, in therapy gym at end  of session, all needs met, returned to room by volunteer. Nursing notified about patient's performance and patient's response to therapy/activity.    AM-PAC™ - ADL (Current Function)     Putting on/taking off regular lower body clothing 4 - None   Bathing 3 - A Little   Toileting 4 - None   Putting on/taking off regular upper body clothing 4 - None   Help for taking care of personal grooming 4 - None   Eating meals 4 - None   AM-PAC™ ADL Score 23      ASSESSMENT AND PLAN     Progress Summary  OT treatment completed. Overall improvement noted today. Pt reported he did mobilize with staff last night as discussed during session yesterday. Mod I STS and toilet transfer, ambulation with RW. Supervision tub transfer. Independent seated distal LB dressing with figure 4. No additional acute OT needs identified. Recommend d/c home with assist when medically stable.    Patient/Family Therapy Goal Statement: to go home    OT Plan      Flowsheet Row Most Recent Value   Rehab Potential good, to achieve stated therapy goals at 10/08/2024 0947   Therapy Frequency daily at 10/08/2024 0947   Planned Therapy Interventions activity tolerance training, adaptive equipment training, BADL retraining, functional balance retraining, occupation/activity based interventions, patient/caregiver education/training, transfer/mobility retraining at 10/08/2024 0947            OT Discharge Recommendations      Flowsheet Row Most Recent Value   OT Recommended Discharge Disposition home with assistance at 10/08/2024 0947   Anticipated Equipment Needs if Discharged Home (OT) none at 10/08/2024 0947                 OT Goals      Flowsheet Row Most Recent Value   Bed Mobility Goal 1    Activity/Assistive Device bed mobility activities, all at 10/08/2024 0947   [place] modified independence at 10/08/2024 0947   Time Frame by discharge at 10/08/2024 0947   Progress/Outcome other (see comments)  [addressed by PTA] at 10/09/2024 0913   Transfer Goal 1     Activity/Assistive Device toilet at 10/08/2024 0947   [place] modified independence at 10/08/2024 0947   Time Frame by discharge at 10/08/2024 0947   Progress/Outcome goal met at 10/09/2024 0913   Transfer Goal 2    Activity/Assistive Device tub at 10/08/2024 0947   [place] supervision required at 10/08/2024 0947   Time Frame by discharge at 10/08/2024 0947   Progress/Outcome goal met at 10/09/2024 0913   Dressing Goal 1    Activity/Adaptive Equipment lower body dressing at 10/08/2024 0947   [place] modified independence at 10/08/2024 0947   Time Frame by discharge at 10/08/2024 0947   Progress/Outcome goal met at 10/09/2024 0913

## 2024-10-09 NOTE — ASSESSMENT & PLAN NOTE
- Cr 1.7 postop but this appears close to his baseline (was 1.65 in August)  - Can resume Losartan, Glucophage and Jardiance on discharge  - Repeat BMP in one week . F/u with PCP in 1-2 weeks

## 2024-10-09 NOTE — PROGRESS NOTES
Pt without new complaints, doing well  AFVSS SWAPNIL in place  5/5 motor BLE and BUE all muscle groups  SILT BLE and BUE all sens dist  Dressing dry     A/P stable post op day 2  -pt, oob  -pain control  -abx/drain  -scds  -med management  -collar

## 2024-10-09 NOTE — PROGRESS NOTES
Ortho note    Pt seen and examined this morning with Dr Lance VELAZQUEZ, doing well  AFVSS SWAPNIL in place- drained 30 cc last shift  5/5 motor BLE all muscle groups  Collar intact  SILT BLE all sens dist  Dressing removed. Incision intact with sutures. No active drainage. Ne dsg applied  Hgb 10.4  Creatinine 1.7     A/P stable post op day 2  -pt, oob   -maintain collar at all times  -pain control  -abx/drain  -scds  -med management  -neuro checks  -bowel regimen  -encourage PO fluids- recheck BMP tomorrow.

## 2024-10-09 NOTE — PROGRESS NOTES
Physical Therapy -  Daily Treatment/Progress Note     Patient: Roberth Amato  Location: [place] [address]  MRN: [id number]  Today's date: 10/9/2024    HISTORY OF PRESENT ILLNESS     Roberth is a 73 y.o. male admitted on 10/7/2024 with Cervical spondylosis with myelopathy [M47.12]  Cervical spinal stenosis [M48.02]  Cervical spine arthritis with nerve pain [M47.812, M79.2]  Paroxysmal atrial fibrillation (CMS/HCC) [I48.0]. Principal problem is Cervical spine arthritis with nerve pain.    Past Medical History  Roberth has a past medical history of Anemia, Hearing decreased, bilateral, Hypertension, Numbness and tingling, Sleep apnea, and Type 2 diabetes mellitus (CMS/HCC).    History of Present Illness  S/p C2-7 Posterior cervical decompression and fusion,instrumentation,allograft,autograft    PRIOR LEVEL OF FUNCTION AND LIVING ENVIRONMENT     Prior Level of Function      Flowsheet Row Most Recent Value   Dominant Hand right   Ambulation independent   Transferring independent   Toileting independent   Bathing independent   Dressing independent   Eating independent   IADLs independent   Driving/Transportation    Communication understands/communicates without difficulty   Swallowing swallows foods/liquids without difficulty   Prior Level of Function Comment typically ind w/ ADLs, IADLs, walks 2 miles every other day for exercise, no AD, drives, is retired practice management for physician groups   Assistive Device Currently Used at Home CPAP, other (see comments)  [B/L  hearing aides and ]             Prior Living Environment      Flowsheet Row Most Recent Value   People in Home spouse   Current Living Arrangements home   Home Accessibility stairs within home (Group)   Living Environment Comment Lives in a 2 SH w/ wife, 0 JOSE, [place] on 1st, 18 steps to 2nd fl bed/bath w/ tub/shower w/ GB (can borrow a shower chair)          VITALS AND PAIN     PT Vitals      Date/Time Pulse SpO2  Pt Activity O2 Therapy BP BP Location BP Method Pt Position [facility]   10/09/24 0912 51 97 % At rest None (Room air) 155/52 Left upper arm Automatic Sitting MD          PT Pain      Date/Time Pain Type Side/Orientation Location Rating: Rest Rating: Activity Interventions [facility]   10/09/24 0912 Pain Assessment posterior neck 4 - moderate pain 4 - moderate pain position adjusted MD             Objective   OBJECTIVE     Start time:  0912  End time:  0938  Session Length: 26 min  Mode of Treatment: co-treatment  Reason for co-treatment: co-tx. w/ OT to expedite d/c    General Observations  Patient received upright, in therapy gym, in chair, leg(s) elevated. He was no issues or concerns identified by nurse prior to session, agreeable to therapy. Pt. rec'd in dept. for tx., hard cerv. collar on t/o    Precautions: spinal, brace worn at all times, fall       Limitations/Impairments: safety/cognitive, hearing   Services  Is an  Needed/Used?: No      PT Eval and Treat - 10/09/24 0912          Bed Mobility    Bed Mobility Activities supine to sit;sit to supine     [place] modified independence     Safety/Cues verbal cues;maintaining precautions;technique;sequencing     Assistive Device --   mat    Comment Pt. educated with log roll technique        Mobility Belt    Mobility Belt Used During Session yes        Sit/Stand Transfer    Surface chair with arm rests;mat     [place] modified independence     Safety/Cues verbal cues;hand placement;technique;maintaining precautions     Assistive Device walker, front-wheeled        Car Transfer    Transfer Technique sit-stand;stand-sit     [place] modified independence     Safety/Cues increased time to complete;verbal cues;hand placement;sequencing;technique;maintaining precautions     Assistive Device walker, front-wheeled        Gait Training    [place], Gait modified independence     Safety/Cues increased time to complete;verbal cues     Assistive  Device walker, front-wheeled     Distance in Feet 150 feet     Deviations/Abnormal Patterns gait speed decreased     Comment Pt. amb. 150 ft. with 2 turns, slow, mildly guarded,yet, steady gait.        Stairs Training    [place], Stairs modified independence     Safety/Cues verbal cues;sequencing;technique;maintaining precautions     Assistive Device railing     Number of Stairs 8     Stair Height 6 inches     Ascending Stairs Technique step-to-step     Descending Stairs Technique step-to-step     Comment 4 steps with B railings & 4 steps with 1 railing. Slow & steady. Cues for no-reciprocal gait pattern        Balance    Static Sitting Balance WFL;sitting in chair;sitting, edge of mat     Dynamic Sitting Balance WFL;sitting, edge of mat;sitting in chair     Sit to Stand Dynamic Balance WFL;supported     Static Standing Balance WFL;supported     Dynamic Standing Balance WFL;supported                            Orthosis Neck Cervical Collar (Active)   Date Obtained/Time Obtained: 10/08/24 1500   Location: Neck  Type: Cervical Collar  Features: Hard  Wearing Schedule: wear at all times (remove only for hygiene and skin inspection)     Orthosis Neck Cervical Collar (Active)   Skin Assessment no redness;no breakdown 10/09/24 0830          Session Outcome  Patient upright, in chair, leg(s) elevated at end of session, all needs met, returned to room by therapy aide. Nursing notified about patient's performance, patient's position, and patient's response to therapy/activity.    AM-PAC™ - Mobility (Current Function)     Turning form your back to your side while in flat bed without using bedrails 4 - None   Moving from lying on your back to sitting on the side of a flat bed without using bedrails 4 - None   Moving to and from a bed to a chair 4 - None   Standing up from a chair using your arms 4 - None   To walk in a hospital room 4 - None   Climbing 3-5 steps with a railing 4 - None   AM-PAC™ Mobility Score 24       ASSESSMENT AND PLAN     Progress Summary  Pt. currently Mod Ind. with overall function. Pt. raúl tx. well, has achieved PT's goals & appears safe for d/c home.    Patient/Family Therapy Goals Statement: to progress mobility    PT Plan      Flowsheet Row Most Recent Value   Rehab Potential good, to achieve stated therapy goals at 10/08/2024 0935   Therapy Frequency 5 times/wk at 10/08/2024 0935   Planned Therapy Interventions balance training, bed mobility training, gait training, strengthening, stair training, transfer training at 10/08/2024 0935            PT Discharge Recommendations      Flowsheet Row Most Recent Value   PT Recommended Discharge Disposition home with assistance at 10/09/2024 0912   Anticipated Equipment Needs if Discharged Home (PT) --  [Patient states pre-owning RW as AD] at 10/09/2024 0912                 PT Goals      Flowsheet Row Most Recent Value   Bed Mobility Goal 1    Activity/Assistive Device bed mobility activities, all at 10/08/2024 0935   [place] modified independence at 10/08/2024 0935   Time Frame by discharge at 10/08/2024 0935   Progress/Outcome goal met at 10/09/2024 0912   Transfer Goal 1    Activity/Assistive Device all transfers, walker, front-wheeled at 10/08/2024 0935   [place] modified independence at 10/08/2024 0935   Time Frame by discharge at 10/08/2024 0935   Progress/Outcome goal met at 10/09/2024 0912   Gait Training Goal 1    Activity/Assistive Device gait (walking locomotion), walker, front-wheeled at 10/08/2024 0935   [place] modified independence at 10/08/2024 0935   Distance 150 at 10/08/2024 0935   Time Frame by discharge at 10/08/2024 0935   Progress/Outcome goal met at 10/09/2024 0912   Stairs Goal 1    Activity/Assistive Device stairs, all skills, cane, straight, using handrail, right at 10/08/2024 0935   [place] modified independence at 10/08/2024 0935   Number of Stairs 4 at 10/08/2024 0935   Time Frame by discharge at 10/08/2024 0935    Progress/Outcome goal met at 10/09/2024 0912

## 2024-10-10 LAB
ANION GAP SERPL CALC-SCNC: 6 MEQ/L (ref 3–15)
BASOPHILS # BLD: 0.03 K/UL (ref 0.01–0.1)
BASOPHILS NFR BLD: 0.5 %
BUN SERPL-MCNC: 46 MG/DL (ref 7–25)
CALCIUM SERPL-MCNC: 9.1 MG/DL (ref 8.6–10.3)
CHLORIDE SERPL-SCNC: 103 MEQ/L (ref 98–107)
CO2 SERPL-SCNC: 26 MEQ/L (ref 21–31)
CREAT SERPL-MCNC: 1.7 MG/DL (ref 0.7–1.3)
DIFFERENTIAL METHOD BLD: ABNORMAL
EGFRCR SERPLBLD CKD-EPI 2021: 41.5 ML/MIN/1.73M*2
EOSINOPHIL # BLD: 0.09 K/UL (ref 0.04–0.54)
EOSINOPHIL NFR BLD: 1.4 %
ERYTHROCYTE [DISTWIDTH] IN BLOOD BY AUTOMATED COUNT: 12 % (ref 11.6–14.4)
GLUCOSE BLD-MCNC: 127 MG/DL (ref 70–99)
GLUCOSE BLD-MCNC: 148 MG/DL (ref 70–99)
GLUCOSE BLD-MCNC: 158 MG/DL (ref 70–99)
GLUCOSE BLD-MCNC: 167 MG/DL (ref 70–99)
GLUCOSE SERPL-MCNC: 163 MG/DL (ref 70–99)
HCT VFR BLD AUTO: 33.7 % (ref 40.1–51)
HGB BLD-MCNC: 11.1 G/DL (ref 13.7–17.5)
IMM GRANULOCYTES # BLD AUTO: 0.02 K/UL (ref 0–0.08)
IMM GRANULOCYTES NFR BLD AUTO: 0.3 %
LYMPHOCYTES # BLD: 1.1 K/UL (ref 1.2–3.5)
LYMPHOCYTES NFR BLD: 17.2 %
MCH RBC QN AUTO: 29.8 PG (ref 28–33.2)
MCHC RBC AUTO-ENTMCNC: 32.9 G/DL (ref 32.2–36.5)
MCV RBC AUTO: 90.6 FL (ref 83–98)
MONOCYTES # BLD: 0.64 K/UL (ref 0.3–1)
MONOCYTES NFR BLD: 10 %
NEUTROPHILS # BLD: 4.53 K/UL (ref 1.7–7)
NEUTS SEG NFR BLD: 70.6 %
NRBC BLD-RTO: 0 %
PLATELET # BLD AUTO: 110 K/UL (ref 150–350)
PMV BLD AUTO: 9.9 FL (ref 9.4–12.4)
POCT TEST: ABNORMAL
POTASSIUM SERPL-SCNC: 4.5 MEQ/L (ref 3.5–5.1)
RBC # BLD AUTO: 3.72 M/UL (ref 4.5–5.8)
SODIUM SERPL-SCNC: 135 MEQ/L (ref 136–145)
WBC # BLD AUTO: 6.41 K/UL (ref 3.8–10.5)

## 2024-10-10 PROCEDURE — 80048 BASIC METABOLIC PNL TOTAL CA: CPT | Performed by: NURSE PRACTITIONER

## 2024-10-10 PROCEDURE — 63700000 HC SELF-ADMINISTRABLE DRUG: Performed by: NURSE PRACTITIONER

## 2024-10-10 PROCEDURE — 63600000 HC DRUGS/DETAIL CODE: Mod: JZ | Performed by: NURSE PRACTITIONER

## 2024-10-10 PROCEDURE — 99232 SBSQ HOSP IP/OBS MODERATE 35: CPT | Mod: FS | Performed by: INTERNAL MEDICINE

## 2024-10-10 PROCEDURE — 20600000 HC ROOM AND CARE INTERMEDIATE/TELEMETRY

## 2024-10-10 PROCEDURE — 25800000 HC PHARMACY IV SOLUTIONS: Performed by: NURSE PRACTITIONER

## 2024-10-10 PROCEDURE — 36415 COLL VENOUS BLD VENIPUNCTURE: CPT | Performed by: NURSE PRACTITIONER

## 2024-10-10 PROCEDURE — 85025 COMPLETE CBC W/AUTO DIFF WBC: CPT | Performed by: NURSE PRACTITIONER

## 2024-10-10 RX ORDER — CARVEDILOL 12.5 MG/1
12.5 TABLET ORAL 2 TIMES DAILY WITH MEALS
Status: DISCONTINUED | OUTPATIENT
Start: 2024-10-10 | End: 2024-10-11

## 2024-10-10 RX ADMIN — POLYETHYLENE GLYCOL 3350 17 G: 17 POWDER, FOR SOLUTION ORAL at 08:08

## 2024-10-10 RX ADMIN — BISACODYL 10 MG: 10 SUPPOSITORY RECTAL at 13:59

## 2024-10-10 RX ADMIN — ACETAMINOPHEN 975 MG: 325 TABLET ORAL at 16:39

## 2024-10-10 RX ADMIN — CARVEDILOL 12.5 MG: 12.5 TABLET, FILM COATED ORAL at 16:39

## 2024-10-10 RX ADMIN — CEFAZOLIN 2 G: 2 INJECTION, POWDER, FOR SOLUTION INTRAMUSCULAR; INTRAVENOUS at 04:05

## 2024-10-10 RX ADMIN — HYDRALAZINE HYDROCHLORIDE 75 MG: 25 TABLET ORAL at 13:14

## 2024-10-10 RX ADMIN — OXYCODONE HYDROCHLORIDE 10 MG: 5 TABLET ORAL at 16:39

## 2024-10-10 RX ADMIN — ATORVASTATIN CALCIUM 40 MG: 40 TABLET, FILM COATED ORAL at 16:39

## 2024-10-10 RX ADMIN — ISOSORBIDE MONONITRATE 30 MG: 30 TABLET, EXTENDED RELEASE ORAL at 08:07

## 2024-10-10 RX ADMIN — OXYCODONE HYDROCHLORIDE 10 MG: 5 TABLET ORAL at 04:04

## 2024-10-10 RX ADMIN — METFORMIN HYDROCHLORIDE 500 MG: 500 TABLET ORAL at 08:38

## 2024-10-10 RX ADMIN — EMPAGLIFLOZIN 10 MG: 10 TABLET, FILM COATED ORAL at 08:38

## 2024-10-10 RX ADMIN — HYDRALAZINE HYDROCHLORIDE 75 MG: 25 TABLET ORAL at 05:26

## 2024-10-10 RX ADMIN — CEFAZOLIN 2 G: 2 INJECTION, POWDER, FOR SOLUTION INTRAMUSCULAR; INTRAVENOUS at 13:14

## 2024-10-10 RX ADMIN — OXYCODONE HYDROCHLORIDE 10 MG: 5 TABLET ORAL at 20:43

## 2024-10-10 RX ADMIN — ACETAMINOPHEN 975 MG: 325 TABLET ORAL at 04:04

## 2024-10-10 RX ADMIN — ACETAMINOPHEN 975 MG: 325 TABLET ORAL at 10:42

## 2024-10-10 RX ADMIN — CARVEDILOL 3.12 MG: 3.12 TABLET, FILM COATED ORAL at 08:07

## 2024-10-10 RX ADMIN — PANTOPRAZOLE SODIUM 40 MG: 40 TABLET, DELAYED RELEASE ORAL at 08:08

## 2024-10-10 RX ADMIN — ASPIRIN 81 MG: 81 TABLET, COATED ORAL at 08:07

## 2024-10-10 RX ADMIN — OXYCODONE HYDROCHLORIDE 10 MG: 5 TABLET ORAL at 08:08

## 2024-10-10 RX ADMIN — SENNOSIDES AND DOCUSATE SODIUM 1 TABLET: 8.6; 5 TABLET ORAL at 08:07

## 2024-10-10 RX ADMIN — LOSARTAN POTASSIUM 50 MG: 50 TABLET ORAL at 08:38

## 2024-10-10 RX ADMIN — OXYCODONE HYDROCHLORIDE 10 MG: 5 TABLET ORAL at 12:09

## 2024-10-10 RX ADMIN — SENNOSIDES AND DOCUSATE SODIUM 1 TABLET: 8.6; 5 TABLET ORAL at 20:42

## 2024-10-10 RX ADMIN — TAMSULOSIN HYDROCHLORIDE 0.4 MG: 0.4 CAPSULE ORAL at 20:43

## 2024-10-10 RX ADMIN — ACETAMINOPHEN 975 MG: 325 TABLET ORAL at 20:42

## 2024-10-10 RX ADMIN — CEFAZOLIN 2 G: 2 INJECTION, POWDER, FOR SOLUTION INTRAMUSCULAR; INTRAVENOUS at 20:42

## 2024-10-10 ASSESSMENT — COGNITIVE AND FUNCTIONAL STATUS - GENERAL
CLIMB 3 TO 5 STEPS WITH RAILING: 3 - A LITTLE
STANDING UP FROM CHAIR USING ARMS: 3 - A LITTLE
MOVING TO AND FROM BED TO CHAIR: 3 - A LITTLE
CLIMB 3 TO 5 STEPS WITH RAILING: 3 - A LITTLE
MOVING TO AND FROM BED TO CHAIR: 3 - A LITTLE
STANDING UP FROM CHAIR USING ARMS: 3 - A LITTLE
WALKING IN HOSPITAL ROOM: 4 - NONE
WALKING IN HOSPITAL ROOM: 4 - NONE

## 2024-10-10 NOTE — PROGRESS NOTES
S  Pt seen and examined at bedside this morning. Pain well controlled. Denies headache, radicular pain, paresthesias. Denies CP,SOB, palpitations, nausea/vomiting.  +flatus, no BM, +Void    O  Awake,alert, orientedx3 NAD, VSS  Cervical spine dressing CDI with SWAPNIL intact with serosanguinous fluid  Cervical collar in place  BUE/BLE motor exam intact 5/5 in all muscle groups  SILT   compartments soft, non-tender     Creat- 1.7  Hg- 11.1  drop in hemoglobin due to acute blood loss, expected from surgery    A  72 y/o male for  Procedure(s) (LRB):  C2-7 Posterior cervical decompression and fusion,instrumentation,allograft,autograft (N/A) with Dr. Lucas on 10/7/2024    P  Pain Management  VTE ppx: SCDs/early mobilization per surgeon   PT/OT- gait training/ ADL's   IV antibiotics/SWAPNIL    Medical Management: HMS   Collar

## 2024-10-10 NOTE — PLAN OF CARE
Care Coordination Discharge Plan Summary    Admission Assessment Summary    General Information  Readmission Within the last 30 days: no previous admission in last 30 days  Does patient have a : No  Patient-Specific Goals (include timeframe): To return home    Living Arrangements  Arrived From:    Current Living Arrangements: home  People in Home: spouse  Home Accessibility: stairs within home (Group)  Living Arrangement Comments: Lives with his wife Bell in a 2 story home w/ no steps to enter. Has 1st floor powder room. Bedroom and full bath on 2nd floor.    Social Drivers of Health - Screenings  Housing Stability  In the last 12 months, was there a time when you were not able to pay the mortgage or rent on time?: No  In the past 12 months, how many times have you moved where you were living?: 0  At any time in the past 12 months, were you homeless or living in a shelter (including now)?: No  Utility Access  In the past 12 months has the electric, gas, oil, or water company threatened to shut off services in your home?: No  Transportation Needs  In the past 12 months, has lack of transportation kept you from medical appointments or from getting medications?: No  In the past 12 months, has lack of transportation kept you from meetings, work, or from getting things needed for daily living?: No    Functional Status Prior to Admission  Assistive Device/Animal Currently Used at Home: CPAP, other (see comments) (B/L  hearing aides and )  Functional Status Comments: Independent at baseline.  IADL Comments: Independent at baseline. He drives. He is retired.    Discharge Needs Assessment    Concerns to be Addressed: discharge planning  Current Discharge Risk: physical impairment  Anticipated Changes Related to Illness: none    Discharge Plan Summary    Patient Choice  Offered/Gave Vendor List: no  Patient's Choice of Community Agency(s): N/A       Concerns / Comments: Met with patient in room.  Discharge pending drain removal. Possibly he will go home later today. IMM letter reviewed and signed with patient. Copy provided. His wife will drive him home.    Discharge Plan:  Disposition/Destination: Home  / Home       Connection to Community  Not applicable  Community Resources:      Discharge Transportation:  Is Out of Hospital DNR needed at Discharge: no  Does patient need discharge transport? No

## 2024-10-10 NOTE — PROGRESS NOTES
Inpatient Cardiology   Daily Progress Note       Overview:   Creatinine remains elevated. Continue increase oral intake, will hold ARB, also hold Jardiance/glucophage with temporary adjustment in BB for better BP control   Assessment/Plan   MICHEAL (acute kidney injury) (CMS/[facility])  Assessment & Plan  - Cr 1.7 POD #2, remains elevated  - Continue to encourage po fluids today  -Will hold ARB/Jardiance, glucophage  - BMP in am    Diabetes mellitus type II, non insulin dependent (CMS/[facility])  Assessment & Plan  - Monitor postop blood sugars. SSIC prn  - Holding Jardiance/glucophage/ARB (doses given thus far) with MICHEAL, will be resuming on discharge  - Daily statin     Essential hypertension  Assessment & Plan  - BP readings not at goal    - Increase Coreg 12.5 mg BID temporarily, will resume home dose on discharge  -Holding Losartan due to MICHEAL  -Continue Hydralazine, Imdur,  Norvasc to 10mg daily (increase)   - Pain management    History of ischemic cardiomyopathy  Assessment & Plan  - Clinically well compensated.   - Previously on Entresto but stopped due to cost.   - Continue GDMTs including Jardiance, Imdur/Hydralazine, Coreg and Losartan on discharge      CAD (coronary artery disease)  Assessment & Plan  - No active CAD symptoms  - BP management as outlined  - Daily statin   - Resume daily aspirin when cleared by ortho    Neuropathy  Assessment & Plan  - Continue Neurontin  - Postop spinal management per ortho    AAA (abdominal aortic aneurysm) (CMS/[facility])  Assessment & Plan  Blood pressure elevated, holding ARB with MICHEAL    Plan as noted    * Cervical spine arthritis with nerve pain  Overview  - s/p C2-C7 PDF on October 7, 2024.     Assessment & Plan  - DVT prophylaxis and pain management per ortho service  - Pulmonary toilet  - Ambulate  - Bowel regimen  - Hold all natural supplements. Resume post op when ok to do so per ortho              Subjective    Patient seen and examined.  Surgical pain controlled.   Patient denies any chest pain, dyspnea, palpitations, or nausea.       Current Medications:    Scheduled:   acetaminophen  975 mg oral q6h INT    amLODIPine  10 mg oral Nightly    aspirin  81 mg oral Daily    atorvastatin  40 mg oral Daily (6p)    bisacodyL  10 mg rectal Nightly    carvediloL  12.5 mg oral BID with meals    ceFAZolin  2 g intravenous q8h INT    [Provider Managed Hold] empagliflozin  10 mg oral Daily    hydrALAZINE  75 mg oral q8h INT    insulin lispro U-100  6-10 Units subcutaneous With meals & nightly    isosorbide mononitrate  30 mg oral Daily    [Provider Managed Hold] losartan  50 mg oral Daily    [Provider Managed Hold] metFORMIN  500 mg oral Daily with breakfast    pantoprazole  40 mg oral Daily    polyethylene glycol  17 g oral Daily    sennosides-docusate sodium  1 tablet oral BID    tamsulosin  0.4 mg oral Nightly       Infusions:      PRN:    alum-mag hydroxide-simeth    glucose **OR** dextrose **OR** glucagon **OR** dextrose 50 % in water (D50)    diazePAM    diphenhydrAMINE    melatonin    ondansetron    oxyCODONE **AND** [] morphine     Objective   Vital signs in last 24 hours:  Temp:  [36.3 °C (97.4 °F)-36.9 °C (98.4 °F)] 36.7 °C (98 °F)  Heart Rate:  [47-56] 56  Resp:  [17-18] 17  BP: (138-181)/(60-77) 181/75    Weights (last 5 days)       Date/Time Weight    10/07/24 1815 102 kg (225 lb 1.4 oz)            Intake/Output Summary (Last 24 hours) at 10/10/2024 06  Last data filed at 10/10/2024 0600  24 Hour Net Input/Output from 7AM Yesterday   Intake 10 ml   Output 100 ml   Net -90 ml     Net IO Since Admission: -1,150 mL [10/10/24 0959]    Physical Exam  Vitals reviewed.   Constitutional:       Appearance: Normal appearance. He is normal weight.   HENT:      Head: Normocephalic and atraumatic.      Nose: Nose normal.      Mouth/Throat:      Mouth: Mucous membranes are moist.   Cardiovascular:      Pulses: Normal pulses.      Heart sounds: Normal heart sounds.   Pulmonary:       Effort: Pulmonary effort is normal.      Breath sounds: Normal breath sounds.   Abdominal:      General: Abdomen is flat. Bowel sounds are normal.      Palpations: Abdomen is soft.   Musculoskeletal:      Right lower leg: No edema.      Left lower leg: No edema.   Skin:     General: Skin is warm and dry.      Comments: Cervical drain and collar in place   Neurological:      Mental Status: He is alert and oriented to person, place, and time.   Psychiatric:         Mood and Affect: Mood normal.         Behavior: Behavior normal.          Labs and Data:      Hematology    Results from last 7 days   Lab Units 10/10/24  0455 10/09/24  0528 10/08/24  0436   WBC K/uL 6.41 9.73 6.33   HEMOGLOBIN g/dL 11.1* 10.4* 11.9*   HEMATOCRIT % 33.7* 30.4* 35.4*   PLATELETS K/uL 110* 121* 107*       Chemistries    Results from last 7 days   Lab Units 10/10/24  0455 10/09/24  0528 10/08/24  0436   SODIUM mEQ/L 135* 136 137   POTASSIUM mEQ/L 4.5 5.1 4.8   CHLORIDE mEQ/L 103 104 105   CREATININE mg/dL 1.7* 1.7* 1.3   BUN mg/dL 46* 41* 27*   CO2 mEQ/L 26 23 23   GLUCOSE mg/dL 163* 159* 208*   CALCIUM mg/dL 9.1 9.4 9.2       Endocrine    Lab Results   Component Value Date    HGBA1C 6.1 (H) 09/16/2024      Radiology:  I have independently reviewed the pertinent imaging from the last 24 hrs.    X-RAY SPINE 1 VIEW    Result Date: 10/7/2024  IMPRESSION: Postoperative changes status post laminectomy at C3-C6 and posterior instrumented fusion at C3-C7 without evidence of complication. COMMENT: Comparison: Intraoperative cervical spine x-rays of earlier the same day. Technique: A frontal intraoperative radiograph of the cervical spine was obtained. FINDINGS: There are postoperative changes status post posterior instrumented fusion at C3-C7 and laminectomy at C3-C6 with placement of bilateral longitudinal rods and lateral mass screws without evidence of hardware complication.  There is a surgical drain projecting over the neck.     X-RAY SPINE 1  VIEW    Result Date: 10/7/2024  IMPRESSION: Postoperative changes status post interval laminectomy at C3-C6 and posterior instrumented fusion at C3-C7 without evidence of complication. COMMENT: Comparison: Intraoperative cervical spine x-ray of earlier the same day. Technique: A single lateral intraoperative radiograph of the cervical spine was obtained. FINDINGS: There are postsurgical changes of interval laminectomy at C3-C6 and posterior instrumented fusion at C3-C7 with placement of bilateral longitudinal rods and lateral mass screws.  There is a surgical drain overlying the posterior soft tissues of the neck with adjacent expected postoperative soft tissue emphysema.     X-RAY SPINE 1 VIEW    Result Date: 10/7/2024  IMPRESSION: Intraoperative localization posterior to C3. COMMENT: Comparison: None. Technique: A single lateral intraoperative projection of the cervical spine was obtained for the purpose of localization. FINDINGS: A localizer is seen posteriorly at C3.  There is straightening of the cervical lordosis.  Vertebral body heights are maintained.  There is no subluxation. There is multilevel intervertebral disc space narrowing and endplate osteophyte formation which is moderate to severe at C3-C6.  There is a surgical mesh noted overlying the occipital calvarium.               ANTOINE Dumont  10/10/2024

## 2024-10-10 NOTE — PLAN OF CARE
Pt is progressing towards discharge. Pt was able to have a bowel movement and drainage from SWAPNIL drain is slowing down. Pt has cleared therapy and moving well. Pt is anticipated to continue recovery at home once cleared medically.     Problem: Adult Inpatient Plan of Care  Goal: Plan of Care Review  Outcome: Progressing

## 2024-10-10 NOTE — PLAN OF CARE
Problem: Adult Inpatient Plan of Care  Goal: Plan of Care Review  Outcome: Progressing  Flowsheets (Taken 10/10/2024 0137)  Plan of Care Reviewed With: patient   Plan of Care Review  Plan of Care Reviewed With: patient    Pt stand by assist with ambulation and transfers. AAOx4, [place] neck collar in place with gauzed dressing beneath this, CDI. Ambulated around the whole unit last night without difficulty. PRN oxycodone administered for neck discomfort, + effects.

## 2024-10-11 VITALS
DIASTOLIC BLOOD PRESSURE: 56 MMHG | HEART RATE: 70 BPM | WEIGHT: 225.09 LBS | BODY MASS INDEX: 29.83 KG/M2 | SYSTOLIC BLOOD PRESSURE: 113 MMHG | OXYGEN SATURATION: 96 % | HEIGHT: 73 IN | TEMPERATURE: 97.7 F | RESPIRATION RATE: 16 BRPM

## 2024-10-11 LAB
ANION GAP SERPL CALC-SCNC: 7 MEQ/L (ref 3–15)
BUN SERPL-MCNC: 42 MG/DL (ref 7–25)
CALCIUM SERPL-MCNC: 9.6 MG/DL (ref 8.6–10.3)
CHLORIDE SERPL-SCNC: 103 MEQ/L (ref 98–107)
CO2 SERPL-SCNC: 26 MEQ/L (ref 21–31)
CREAT SERPL-MCNC: 1.7 MG/DL (ref 0.7–1.3)
EGFRCR SERPLBLD CKD-EPI 2021: 41.5 ML/MIN/1.73M*2
GLUCOSE BLD-MCNC: 152 MG/DL (ref 70–99)
GLUCOSE BLD-MCNC: 158 MG/DL (ref 70–99)
GLUCOSE SERPL-MCNC: 178 MG/DL (ref 70–99)
POCT TEST: ABNORMAL
POCT TEST: ABNORMAL
POTASSIUM SERPL-SCNC: 4.6 MEQ/L (ref 3.5–5.1)
SODIUM SERPL-SCNC: 136 MEQ/L (ref 136–145)

## 2024-10-11 PROCEDURE — 36415 COLL VENOUS BLD VENIPUNCTURE: CPT | Performed by: NURSE PRACTITIONER

## 2024-10-11 PROCEDURE — 25800000 HC PHARMACY IV SOLUTIONS: Performed by: NURSE PRACTITIONER

## 2024-10-11 PROCEDURE — 99232 SBSQ HOSP IP/OBS MODERATE 35: CPT | Mod: FS | Performed by: INTERNAL MEDICINE

## 2024-10-11 PROCEDURE — 63700000 HC SELF-ADMINISTRABLE DRUG: Performed by: NURSE PRACTITIONER

## 2024-10-11 PROCEDURE — 63600000 HC DRUGS/DETAIL CODE: Mod: JZ | Performed by: NURSE PRACTITIONER

## 2024-10-11 PROCEDURE — 80048 BASIC METABOLIC PNL TOTAL CA: CPT | Performed by: NURSE PRACTITIONER

## 2024-10-11 RX ORDER — AMOXICILLIN 250 MG
1 CAPSULE ORAL 2 TIMES DAILY
Qty: 60 TABLET | Refills: 0 | Status: SHIPPED | OUTPATIENT
Start: 2024-10-11 | End: 2024-11-10

## 2024-10-11 RX ORDER — AMLODIPINE BESYLATE 5 MG/1
5 TABLET ORAL NIGHTLY
Status: DISCONTINUED | OUTPATIENT
Start: 2024-10-11 | End: 2024-10-11 | Stop reason: HOSPADM

## 2024-10-11 RX ORDER — OXYCODONE HYDROCHLORIDE 5 MG/1
5-10 TABLET ORAL EVERY 4 HOURS PRN
Qty: 30 TABLET | Refills: 0 | Status: SHIPPED | OUTPATIENT
Start: 2024-10-11 | End: 2024-10-16

## 2024-10-11 RX ORDER — CARVEDILOL 3.12 MG/1
3.12 TABLET ORAL 2 TIMES DAILY WITH MEALS
Status: DISCONTINUED | OUTPATIENT
Start: 2024-10-11 | End: 2024-10-11 | Stop reason: HOSPADM

## 2024-10-11 RX ADMIN — ACETAMINOPHEN 975 MG: 325 TABLET ORAL at 05:14

## 2024-10-11 RX ADMIN — HYDRALAZINE HYDROCHLORIDE 75 MG: 25 TABLET ORAL at 06:17

## 2024-10-11 RX ADMIN — PANTOPRAZOLE SODIUM 40 MG: 40 TABLET, DELAYED RELEASE ORAL at 08:49

## 2024-10-11 RX ADMIN — ISOSORBIDE MONONITRATE 30 MG: 30 TABLET, EXTENDED RELEASE ORAL at 08:48

## 2024-10-11 RX ADMIN — OXYCODONE HYDROCHLORIDE 10 MG: 5 TABLET ORAL at 08:49

## 2024-10-11 RX ADMIN — CEFAZOLIN 2 G: 2 INJECTION, POWDER, FOR SOLUTION INTRAMUSCULAR; INTRAVENOUS at 05:14

## 2024-10-11 RX ADMIN — POLYETHYLENE GLYCOL 3350 17 G: 17 POWDER, FOR SOLUTION ORAL at 08:49

## 2024-10-11 RX ADMIN — SENNOSIDES AND DOCUSATE SODIUM 1 TABLET: 8.6; 5 TABLET ORAL at 08:49

## 2024-10-11 RX ADMIN — ACETAMINOPHEN 975 MG: 325 TABLET ORAL at 10:48

## 2024-10-11 RX ADMIN — ASPIRIN 81 MG: 81 TABLET, COATED ORAL at 08:49

## 2024-10-11 ASSESSMENT — COGNITIVE AND FUNCTIONAL STATUS - GENERAL
WALKING IN HOSPITAL ROOM: 4 - NONE
STANDING UP FROM CHAIR USING ARMS: 3 - A LITTLE
CLIMB 3 TO 5 STEPS WITH RAILING: 3 - A LITTLE
MOVING TO AND FROM BED TO CHAIR: 3 - A LITTLE

## 2024-10-11 NOTE — DISCHARGE SUMMARY
Ortho Discharge Summary    Admitting Provider: Krish Lucas MD  Discharge Provider: Krish Lucas MD  Primary Care Physician at Discharge: Maikel Juares [phone]     Admission Date: 10/7/2024     Discharge Date: 10/11/2024    Primary Discharge Diagnosis  Cervical spine arthritis with nerve pain    Secondary Discharge Diagnosis      Discharge Disposition  Home   Code Status at Discharge: Full Code    Discharge Medications     Medication List        START taking these medications      oxyCODONE 5 mg immediate release tablet  Commonly known as: ROXICODONE  Take 1-2 tablets (5-10 mg total) by mouth every 4 (four) hours as needed for pain for up to 5 days.  Dose: 5-10 mg     sennosides-docusate sodium 8.6-50 mg  Commonly known as: SENOKOT-S  Take 1 tablet by mouth 2 (two) times a day.  Dose: 1 tablet            CONTINUE taking these medications      amLODIPine 5 mg tablet  Commonly known as: NORVASC  Take 5 mg by mouth daily.  Dose: 5 mg     aspirin 81 mg enteric coated tablet  Take 81 mg by mouth daily. Last dose 2 weeks ago  Dose: 81 mg     atorvastatin 40 mg tablet  Commonly known as: LIPITOR  Take 40 mg by mouth daily.  Dose: 40 mg     carvediloL 3.125 mg tablet  Commonly known as: COREG  Take 3.125 mg by mouth 2 (two) times a day with meals.  Dose: 3.125 mg     hydrALAZINE 50 mg tablet  Commonly known as: APRESOLINE  Take 75 mg by mouth 3 (three) times a day.  Dose: 75 mg     isosorbide mononitrate 30 mg 24 hr tablet  Commonly known as: IMDUR  Take 30 mg by mouth daily.  Dose: 30 mg     JARDIANCE 10 mg tablet  Take 10 mg by mouth daily. Last dose 10/3/24  Dose: 10 mg  Generic drug: empagliflozin     losartan 50 mg tablet  Commonly known as: COZAAR  Take 50 mg by mouth daily.  Dose: 50 mg     mirtazapine 15 mg tablet  Commonly known as: REMERON  Take 15 mg by mouth nightly.  Dose: 15 mg     tamsulosin 0.4 mg capsule  Commonly known as: FLOMAX  Take 0.4 mg by mouth nightly.  Dose: 0.4 mg               Active Issues Requiring Follow-up  Issue: MICHEAL Styles 1.7  Responsible Individual: PCP  What is Needed: f/u BMP  Follow-up Appointments Arranged: Script sent    Outpatient Follow-Up  Encounter Information    This patient does not currently have any appointments scheduled.         Referrals and Follow-ups to Schedule       Basic metabolic panel      Release to patient: Immediate          Test Results Pending at Discharge  Unresulted Labs (From admission, onward)       Start     Ordered    10/11/24 0000  Basic metabolic panel        Question:  Release to patient  Answer:  Immediate    10/11/24 1125                    DETAILS OF HOSPITAL STAY    Presenting Problem/History of Present Illness  Cervical spondylosis with myelopathy [M47.12]  Cervical spinal stenosis [M48.02]  Cervical spine arthritis with nerve pain [M47.812, M79.2]  Paroxysmal atrial fibrillation (CMS/HCC) [I48.0]      Hospital Course  The pt underwent the procedure after being medically cleared and tolerated it well. He was transferred to [facility] for continued post-op care. He had an unremarkable hospital course that consisted of Medical and Cardiac management, PT/OT, pain management, and DVT prophylaxis. By day of D/C, he was tolerating his diet, voiding without issue, and pain was controlled. He was cleared from Orthopedic, Cardiac, and PT/OT standpoints and was D/C'ed home without issue.    Operative Procedures Performed  Procedure(s):  C2-7 Posterior cervical decompression and fusion,instrumentation,allograft,autograft  Consults: BON

## 2024-10-11 NOTE — NURSING NOTE
2100: EL=349/50, which is lower than last night BP. Carvedilol dose increased today. Call to Akiko bueno about whether to HOLD the norvasc and hydralazine, Akiko suggested to recheck BP after pt back in bed. BP at 4658=240/58 and HR=43. Call to [facility] and spoke with  Dr. Wilson of this, he said to HOLD the norvasc and hydralazine for tonight.

## 2024-10-11 NOTE — PROGRESS NOTES
S  Pt seen and examined at bedside this morning. Pain well controlled. Denies headache, radicular pain, paresthesias. Denies CP,SOB, palpitations, nausea/vomiting.  +flatus, + BM, +Void     O  Awake,alert, orientedx3 NAD, VSS  Cervical spine dressing CDI with SWAPNIL intact with serosanguinous fluid  Cervical collar in place  BUE/BLE motor exam intact 5/5 in all muscle groups  SILT   compartments soft, non-tender     Labs pending     A  72 y/o male for  Procedure(s) (LRB):  C2-7 Posterior cervical decompression and fusion,instrumentation,allograft,autograft (N/A) with Dr. Lucas on 10/7/2024     P  Pain Management  VTE ppx: SCDs/early mobilization per surgeon   PT/OT- gait training/ ADL's   IV antibiotics/SWAPNIL    Medical Management: HMS   Collar  D/C pending clearances

## 2024-10-11 NOTE — PROGRESS NOTES
Inpatient Cardiology   Daily Progress Note       Overview:s/p cervical spine surgery.Medically stable for discharge     Assessment/Plan   MICHEAL (acute kidney injury) (CMS/[facility])  Assessment & Plan  - Cr 1.7 postop but this appears close to his baseline (was 1.65 in August)  - Can resume Losartan, Glucophage and Jardiance on discharge  - Repeat BMP in one week . F/u with PCP in 1-2 weeks     Essential hypertension  Assessment & Plan  - BP readings labile  - Would continue his usual dose of Norvasc 5mg daily , Coreg 3.125mg bid, Hydralazine 75mg tid, Imdur 30mg daily, and Losartan 50mg daily on discharge  - Home BP monitoring  - Pain management    * Cervical spine arthritis with nerve pain  Overview  - s/p C2-C7 PDF on October 7, 2024.     Assessment & Plan  - DVT prophylaxis and pain management per ortho service  - Pulmonary toilet  - Ambulate  - Bowel regimen  - Hold all natural supplements. Resume post op when ok to do so per ortho    - Medically stable for discharge    Diabetes mellitus type II, non insulin dependent (CMS/[facility])  Assessment & Plan  - Monitor postop blood sugars. SSIC prn  - Resume Jardiance/glucophage/  - Daily statin   - resume Losartan on discharge    AAA (abdominal aortic aneurysm) (CMS/[facility])  Assessment & Plan   BP management  Resume losartan on discharge  Same dose of Coreg, Imdur, Norvasc and Hydralazine    History of ischemic cardiomyopathy  Assessment & Plan  - Clinically well compensated.   - Previously on Entresto but stopped due to cost.   - Continue GDMTs including Jardiance, Imdur/Hydralazine, Coreg and Losartan on discharge      CAD (coronary artery disease)  Assessment & Plan  - No active CAD symptoms  - BP management as outlined  - Daily statin   - Resume daily aspirin when cleared by ortho    Neuropathy  Assessment & Plan  - Continue Neurontin  - Postop spinal management per ortho            Subjective   He feels well. Ambulating in [place]. Denies ha, dizziness,cp or sob.  Postop pain controlled       Current Medications:    Scheduled:   acetaminophen  975 mg oral q6h INT    amLODIPine  5 mg oral Nightly    aspirin  81 mg oral Daily    atorvastatin  40 mg oral Daily (6p)    bisacodyL  10 mg rectal Nightly    carvediloL  3.125 mg oral BID with meals    ceFAZolin  2 g intravenous q8h INT    empagliflozin  10 mg oral Daily    hydrALAZINE  75 mg oral q8h INT    insulin lispro U-100  6-10 Units subcutaneous With meals & nightly    isosorbide mononitrate  30 mg oral Daily    losartan  50 mg oral Daily    metFORMIN  500 mg oral Daily with breakfast    pantoprazole  40 mg oral Daily    polyethylene glycol  17 g oral Daily    sennosides-docusate sodium  1 tablet oral BID    tamsulosin  0.4 mg oral Nightly       Infusions:      PRN:    alum-mag hydroxide-simeth    glucose **OR** dextrose **OR** glucagon **OR** dextrose 50 % in water (D50)    diazePAM    diphenhydrAMINE    melatonin    ondansetron    oxyCODONE **AND** [] morphine     Objective   Vital signs in last 24 hours:  Temp:  [36.2 °C (97.1 °F)-37.1 °C (98.7 °F)] 36.5 °C (97.7 °F)  Heart Rate:  [43-70] 70  Resp:  [16] 16  BP: (105-149)/(50-77) 113/56    Weights (last 5 days)       Date/Time Weight    10/07/24 1815 102 kg (225 lb 1.4 oz)            Intake/Output Summary (Last 24 hours) at 10/11/2024 0659  Last data filed at 10/11/2024 0600  24 Hour Net Input/Output from 7AM Yesterday   Intake 10 ml   Output 55 ml   Net -45 ml     Net IO Since Admission: -1,195 mL [10/11/24 1127]    Physical Exam  Vitals reviewed.   Constitutional:       Appearance: He is well-developed.   HENT:      Head: Normocephalic and atraumatic.      Mouth/Throat:      Mouth: Mucous membranes are moist.   Eyes:      General: No scleral icterus.  Neck:      Vascular: No JVD.      Comments: Cervical collar  Cardiovascular:      Rate and Rhythm: Normal rate and regular rhythm.      Pulses: Normal pulses.           Dorsalis pedis pulses are 2+ on the right side  and 2+ on the left side.      Heart sounds: S1 normal and S2 normal. No murmur heard.  Pulmonary:      Breath sounds: No wheezing or rales.   Abdominal:      General: Bowel sounds are normal.      Palpations: Abdomen is soft.   Musculoskeletal:         General: Normal range of motion.      Cervical back: Tenderness present.   Skin:     General: Skin is warm and dry.   Neurological:      Mental Status: He is alert and oriented to person, place, and time.   Psychiatric:         Attention and Perception: Attention normal.         Mood and Affect: Mood normal.         Speech: Speech normal.         Behavior: Behavior is cooperative.         Thought Content: Thought content normal.         Cognition and Memory: Cognition and memory normal.              Labs and Data:      Hematology    Results from last 7 days   Lab Units 10/10/24  0455 10/09/24  0528 10/08/24  0436   WBC K/uL 6.41 9.73 6.33   HEMOGLOBIN g/dL 11.1* 10.4* 11.9*   HEMATOCRIT % 33.7* 30.4* 35.4*   PLATELETS K/uL 110* 121* 107*       Chemistries    Results from last 7 days   Lab Units 10/11/24  1022 10/10/24  0455 10/09/24  0528   SODIUM mEQ/L 136 135* 136   POTASSIUM mEQ/L 4.6 4.5 5.1   CHLORIDE mEQ/L 103 103 104   CREATININE mg/dL 1.7* 1.7* 1.7*   BUN mg/dL 42* 46* 41*   CO2 mEQ/L 26 26 23   GLUCOSE mg/dL 178* 163* 159*   CALCIUM mg/dL 9.6 9.1 9.4         Endocrine    Lab Results   Component Value Date    HGBA1C 6.1 (H) 09/16/2024                               ANTOINE Cochran  10/11/2024

## 2024-10-11 NOTE — PLAN OF CARE
Problem: Adult Inpatient Plan of Care  Goal: Plan of Care Review  Outcome: Progressing  Flowsheets (Taken 10/11/2024 1144)  Plan of Care Reviewed With: patient   Plan of Care Review  Plan of Care Reviewed With: patient

## 2025-02-10 ENCOUNTER — TRANSCRIBE ORDERS (OUTPATIENT)
Dept: CARDIOLOGY | Facility: HOSPITAL | Age: 75
End: 2025-02-10

## 2025-02-10 DIAGNOSIS — R26.81 UNSTEADINESS ON FEET: Primary | ICD-10-CM

## 2025-02-11 ENCOUNTER — HOSPITAL ENCOUNTER (OUTPATIENT)
Dept: PHYSICAL THERAPY | Facility: REHABILITATION | Age: 75
Setting detail: THERAPIES SERIES
Discharge: HOME | End: 2025-02-11
Attending: PSYCHIATRY & NEUROLOGY
Payer: MEDICARE

## 2025-02-11 DIAGNOSIS — Z74.09 IMPAIRED FUNCTIONAL MOBILITY, BALANCE, GAIT, AND ENDURANCE: Primary | ICD-10-CM

## 2025-02-11 DIAGNOSIS — R26.81 UNSTEADINESS ON FEET: ICD-10-CM

## 2025-02-11 PROCEDURE — 97163 PT EVAL HIGH COMPLEX 45 MIN: CPT | Mod: GP

## 2025-02-11 PROCEDURE — 97530 THERAPEUTIC ACTIVITIES: CPT | Mod: GP

## 2025-02-11 ASSESSMENT — BALANCE ASSESSMENTS: TOTAL SCORE: 45

## 2025-02-11 NOTE — PROGRESS NOTES
Physical Therapy Evaluation    [facility] Fax: [phone]    PT EVALUATION FOR OUTPATIENT THERAPY    Patient: Roberth Amato    MRN: [id number]  : 1950 74 y.o.     Referring Physician: Augusto Murray DO  Date of Visit: 2025      Certification Dates:  25 through 25  1-2x/week      Recommended Frequency & Duration:  2 times/week for up to 3 months     Diagnosis:   1. Impaired functional mobility, balance, gait, and endurance    2. Unsteadiness on feet        Chief Complaints:   Chief Complaint   Patient presents with    Balance Deficits    Dec Coordination    Decreased Endurance    Dec Strength    Decreased Trunk Control       Precautions:    Precautions additional comments: diabetic neuropathy    Past Medical History:   Past Medical History:   Diagnosis Date    Anemia     Hearing decreased, bilateral     hx of    Hypertension     Numbness and tingling     hands and feet    Sleep apnea     Type 2 diabetes mellitus (CMS/HCC)        Past Surgical History:   Past Surgical History   Procedure Laterality Date    Brain surgery Right     acoustic neuroma removed; metal plate in head    C2-7 Posterior cervical decompression and fusion,instrumentation,allograft,autograft N/A 10/7/2024    Performed by Krish Lucas MD at  [facility]    Cataract extraction      hx of    Cholecystectomy      Coronary artery bypass graft      Liver surgery      removed the abcess         LEARNING ASSESSMENT    Assessment completed:  Yes    Learner name:  Destin    Learner: Patient    Learning Barriers:  Learning barriers: No Barriers    Preferred Language: English     Needed: No    Education Provided:   Method: Discussion and Demonstration  Readiness: acceptance  Response: Demonstrated understanding      CO-LEARNER ASSESSMENT:    Completed: No      Welcome letter discussed: Yes Patient provided with Welcome Letter, which includes attendance policy. Provided education regarding cancellation and  no-show policy. Education regarding the importance of participation and regular attendance to maximize goal attainment.       OBJECTIVE MEASUREMENTS/DATA:    Time In Session:  Start Time: 1300  Stop Time: 1400  Time Calculation (min): 60 min   Assessment and Plan - 02/11/25 1308          Assessment    Plan of Care reviewed and patient/family in agreement Yes     System Pathology/Pathophysiology Noted cardiovascular;neuromuscular;musculoskeletal     Functional Limitations in Following Categories (PT Eval) home management;community/leisure     Rehab Potential/Prognosis re-evaluate goals as necessary;good, to achieve stated therapy goals     Problem List decreased endurance;impaired balance;decreased strength;decreased flexibility;impaired coordination;impaired postural control;impaired sensory feedback;impaired sensation     Actions taken discussed possible benefits of OT, pt politely declining     Clinical Assessment Destin is a 73 yo M who arrives to PT initial evaluation w/complaints of balance issues. Pt also limited by balance and coordination issues with impaired Faustino, impaired proprioception, and impaired sensation. Pt with decreased ankle and hamstring flexibility due to muscular tightness from decreased mobility. Pt with decreased endurance and activity tolerance, however is able to get to the gym 3x/week to work on treadmill training up to 3miles at most, and then weight training UEs and LEs. Pt with impaired gross strength bilaterally in hips. Pt reports frequent tripping and toe catching but no falls within the past year. He scored below age matched norms with 6MWT of 1147', and gait speed at 1.12m/s, and 30sec sit<>stand at 10reps. Pt scored a 45/56 on the Mendoza and had impaired balance, especially with tandem and single limb stance. PT reviewed modified tandem work to be done at his kitchen sink, and to increase the challenge with head turns. PT had pt practice this and no concern to practice this set up at  home. Cont with skilled OPPT services 2x/week for grossly 3months to address balance, coordination, strength, and endurance to reduce risk of falls, maximize QOL and functional (I), as pt has goals to return to home activities and his favorite hobby; golf.     Plan and Recommendations Assess elevations. Floor transfers. Balance     Planned Services CPT 89973 Gait training;CPT 13908 Manual therapy;CPT 58095 Neuromuscular Reeducation;CPT 12050 Therapeutic activities;CPT 68095 Therapeutic exercises;CPT 26877 Electrical stimulation ATTENDED;CPT 63483 Hot/Cold Packs therapy     Comments/Additional Services Vector. FES Cycling. FES X[employer]E                    General Information - 02/11/25 1308          Session Details    Document Type initial evaluation     Mode of Treatment individual therapy        General Information    Onset of Illness/Injury or Date of Surgery 10/07/24     Referring Physician Dr. Murray     History of present illness/functional impairment Destin is a 75 yo M with a PMH of AAA, DM Type II, stage III kidney disease, CAD, s/p CABGx4 January 2021, neuropathy.s/p C2-7 PDF 10/7/24 at [facility], with DC on 10/11/24 due to cervical myelopathy. DC home without homecare. Since then he has been going to the gym. Pt continues to have some minor hand tingling complaints and continues to have his baseline diabetic neuropathy in feet. Eager to start PT.     Patient/Family/Caregiver Comments/Observations pt received in waiting room , walking in (I) without a device     Precautions comments diabetic neuropathy                    Pain/Vitals - 02/11/25 1308          Pain Assessment    Currently in pain No/Denies        Pre Activity Vital Signs    Pulse 74     /77        Post Activity Vital Signs    Post Activity Pulse 78     Post Activity SpO2 98 %                    Falls/Food Screening - 02/11/25 1308          Initial Falls Assessment    One or more falls in the last year No        Food Insecurity    Within  the past 12 months, you worried that your food would run out before you got the money to buy more. Never true     Within the past 12 months, the food you bought just didn't last and you didn't have money to get more. Never true                    PT - 02/11/25 1308          Physical Therapy    Physical Therapy Specialty Traditional Neuro PT        PT Plan    Frequency of treatment 2 times/week     PT Duration 3 months     PT Custom Frequency and Duration 1-2x/week     PT Cert From 02/11/25     PT Cert To 05/12/25     Date PT POC was sent to provider 02/11/25     Signed PT Plan of Care received?  No                       Living Environment    Living Environment - 02/11/25 1308          Living Environment    People in Home spouse     Living Environment Comment lives in a 2 STH with wife Bell, 0 JOSE, half bath on first floor, bed and bath on 2nd floor with grab bar and tub shower.     Transportation Concerns none                   PLOF:    Prior Level of Function - 02/11/25 1308          OTHER    Previous level of function (I). retired . no device used. sometimes still uses soft collar at home. enjoys golfing and wants to get back to his league with his friends in May                   Sensory Tests    Sensory Testing - 02/11/25 1308          Sensory Assessment    Sensory Assessment --   LT intact inconsistently on B feet. dual simultaneous stimulation intact       Hearing Assessment    Hearing Status [employer]        Vision Assessment/Intervention    Vision Assessment [employer]                   Skin    Skin Assessment - 02/11/25 1308           Skin    Skin Condition --   skin visualized on B feet. dry skin, superficial wound on R foot, medially along met head. no other issues.                  ROM    Range of Motion - 02/11/25 1500          RIGHT: Lower Extremity PROM Assessment    Ankle Dorsiflexion  10 degrees        LEFT: Lower Extremity PROM Assessment    Ankle Dorsiflexion  10 degrees                    MMT    Manual Muscle Tests - 02/11/25 1308          RIGHT: Lower Extremity Manual Muscle Test Assessment    Hip Flexion gross movement (4/5) good     Hip Extension gross movement (4/5) good     Hip Abduction gross movement (4/5) good     Hip Adduction gross movement (4/5) good     Knee Flexion strength (4/5) good     Knee Extension strength (4/5) good     Ankle Dorsiflexion gross movement (4/5) good     Ankle Plantarflexion gross movement (4/5) good        LEFT: Lower Extremity Manual Muscle Test Assessment    Hip Flexion gross movement (4/5) good     Hip Extension gross movement (4/5) good     Hip Abduction gross movement (4/5) good     Hip Adduction gross movement (4/5) good     Knee Flexion strength (4/5) good     Knee Extension strength (4/5) good     Ankle Dorsiflexion gross movement (4/5) good     Ankle Plantarflexion gross movement (4/5) good                   Transfers    Transfers - 02/11/25 1308          Bed Mobility    Bed Mobility Activities scooting/bridging;rolling;supine to sit;sit to supine     Franklin independent        Sit to Stand Transfer    [place]lin, Sit to Stand Transfer independent        Stand to Sit Transfer    [place], Stand to Sit Transfer independent        Stand Pivot Transfer    [place], Stand Pivot/Stand Step Transfer independent        Bed Mobility    [place], Scoot/Bridge independent                   Gait and Mobility    Gait and Mobility - 02/11/25 1308          Gait Training    Franklin, Gait independent     Comment WBOS, decreased L eccentric control in loading, varied step length and width                   Neuromuscular/Motor    Neuromuscular/Motor - 02/11/25 1308          Motor Skills    Motor Skills coordination     Comment: Coordination min impaired Faustino B. intact 5/5 trials L great toe proprioception . R foot 3/8 correct great toe trials proprioception. intact finger opposition, faster on L.                   Balance/Posture   Outcome  Measures    PT Outcome Measures - 02/11/25 1308          Objective Outcome Measures    6 Minute Walk Test 1147   no AD, no LOB, (I)    Gait Speed (m/sec) 1.12 m/sec     30 Second Sit to Stand Test 10 repetitions   standard chair, UEs on thighs       Mendoza Balance Scale    Sitting to Standing Able to stand without using hands and stabilize independently     Standing Unsupported Able to stand safely for 2 minutes     Sitting with Back Unsupported but Feet Supported on Floor or on a Stool Able to sit safely and securely for 2 minutes     Standing to Sitting Sits safely with minimal use of hands     Transfers Able to transfer safely with minor use of hands     Standing Unsupported with Eyes Closed Able to stand 3 seconds     Standing Unsupported with Feet Together Able to place feet together independently and stand 1 minute safely     Reach Forward with Outstretched Arm While Standing Can reach forward 12 cm (5 inches)      Object from Floor from a Standing Position Able to  slipper but needs supervision     Turning to Look Behind Over Left and Right Shoulders While Standing Turns sideways only but maintains balance     Turn 360 Degrees Able to turn 360 degrees safely in 4 seconds or less     Place Alternate Foot on Step or Stool While Standing Unsupported Able to stand independently and complete 8 steps in greater than 20 seconds     Standing Unsupported One Foot in Front Able to take small step independently and hold 30 seconds     Standing on One Leg Able to lift leg independently and hold greater than or equal to 3 seconds     Mendoza Balance Score 45        NEW (2/6/23):  PSFS     PSFS ACTIVITY 1 Golfing     PSFS ANSWER 1 2     PSFS ACTIVITY 2 stair climbing     PSFS ANSWER 2 8     PSFS ACTIVITY 3 walking 3 miles     PSFS ANSWER 3 6     PSFS Sum of Activity Scores 16     PSFS Number of Activities 3     PSFS Percent Score 53.33 %                      Goals         <enter goal here> (pt-stated)        "Mutually agreed upon pain goal       PT; Balance (pt-stated)       Short Term Goals Time Frame Result Comment/Progress   Pt will ascend/descend full flight of stairs reciprocally no UE support, Huber 4-6 weeks     Assess elevations. 4-6 weeks     Pt will tolerate at least 10 minutes of seated cardiovascular endurance training with stable vital signs, to improve pt's activity tolerance. 4-6 weeks     Pt will increase 6MWT by 100'  4-6 weeks      Progress gait speed by 0.05 m/sec without decline in safety or quality while using no AD. 4-6 weeks     Pt and caregiver will be mod I with HEP 4-6 weeks       Long Term Goals Time Frame Result Comment/Progress   Pt will ascend/descend 4 steps without HR, at (I) level  10-12 weeks     Pt will improve by at least 5 points on Mendoza Balance Scale to indicate clinically significant improvement in standing balance and decreased falls risk. 10-12 weeks     Pt will improve 6MWT by at least 150 ft to demonstrate clinically significant improvement in walking balance. 10-12 weeks     Pt will improve by at least 0.1 m/s during 10MWT to indicate clinically significant improvement in walking speed  10-12 weeks     Pt will be mod I with updated HEP 10-12 weeks     Pt will ascend/descend 6\" curb and mod grade ramp, (I) 10-12 weeks     Pt will tolerate at least 15 minutes of seated cardiovascular endurance training with stable vital signs, to improve pt's activity tolerance.  10-12 weeks       \"I want to be able to golf in the summer\"                   TREATMENT PLAN:    PT Neuro Exercises Current Session Time Completed   THER ACT   (CPT Code 52231) TOTAL TIME FOR SESSION 8-22 Minutes    Monitored Vitals, Pain Monitored  Hx of varied feelings of lightheadedness    Transfer training     Orthotic assessment NA    Patient Education POC, role of PT, safety, HEP semi tandem and how to progress with HHT and VHT Yes    THER EX  (CPT Code 01800) TOTAL TIME FOR SESSION Not performed Completed "   STRETCHING     Stretching  Hamstrings   DF slant board    Supine There-Ex Clamshells  Bridges  SLR  S/L hip abd    Seated Ther-Ex     Standing There-Ex     Nu Step (time/level/SPM) Warm up     Stationary Bike     Treadmill With varied UE support     NEURO RE-ED  (CPT Code 64819) TOTAL TIME FOR SESSION Not performed Completed   Balance Training Rockerboard    Airex    Split Stance    Coordination 4 square    Hurdles    Agility ladder    Pre Gait Activities Block tapping    Postural Re-Education Back against wall, open books, PPT  Chin tucks  Rows  Palloff press    Outcome Assessments Mendoza      GAIT TRAINING  (CPT Code 35684) TOTAL TIME FOR SESSION Not performed Completed   Ambulation     Walking Assessments     Stair negotiation     Curb negotiation     Ramp negotiation     Outdoor ambulation     MANUAL  (CPT Code 24292) TOTAL TIME FOR SESSION Not performed Completed   Stretching by therapist/PROM     Mobilization      MODALITIES   TOTAL TIME FOR SESSION Not performed Completed   Ice     Heat     ATTENDED E-STIM  (CPT Code 18313) TOTAL TIME FOR SESSION Not performed Completed   Attended E-Stim          ASSESSMENT:    This 74 y.o. year old male presents to PT with above stated diagnosis. Physical Therapy evaluation reveals decreased endurance, impaired balance, decreased strength, decreased flexibility, impaired coordination, impaired postural control, impaired sensory feedback, impaired sensation resulting in home management, community/leisure limitations. Roberth Amato will benefit from skilled PT services to address limitation, work towards rehab and patient goals and maximize PLOF of chosen ADLs.     Planned Services: The patient's treatment will include CPT 80198 Gait training, CPT 63691 Manual therapy, CPT 92857 Neuromuscular Reeducation, CPT 65957 Therapeutic activities, CPT 26432 Therapeutic exercises, CPT 09101 Electrical stimulation ATTENDED, CPT 89785 Hot/Cold Packs therapy,[employer]. FES Cycling. FES  [employer].     Blake Alicia, PT

## 2025-02-11 NOTE — OP PT TREATMENT LOG
PT Neuro Exercises Current Session Time Completed   THER ACT   (CPT Code 37166) TOTAL TIME FOR SESSION 8-22 Minutes    Monitored Vitals, Pain Monitored  Hx of varied feelings of lightheadedness    Transfer training     Orthotic assessment NA    Patient Education POC, role of PT, safety, HEP semi tandem and how to progress with HHT and VHT Yes    THER EX  (CPT Code 31564) TOTAL TIME FOR SESSION Not performed Completed   STRETCHING     Stretching  Hamstrings   DF slant board    Supine There-Ex Clamshells  Bridges  SLR  S/L hip abd    Seated Ther-Ex     Standing There-Ex     Nu Step (time/level/SPM) Warm up     Stationary Bike     Treadmill With varied UE support     NEURO RE-ED  (CPT Code 92060) TOTAL TIME FOR SESSION Not performed Completed   Balance Training Rockerboard    Airex    Split Stance    Coordination 4 square    Hurdles    Agility ladder    Pre Gait Activities Block tapping    Postural Re-Education Back against wall, open books, PPT  Chin tucks  Rows  Palloff press    Outcome Assessments Mendoza      GAIT TRAINING  (CPT Code 55986) TOTAL TIME FOR SESSION Not performed Completed   Ambulation     Walking Assessments     Stair negotiation     Curb negotiation     Ramp negotiation     Outdoor ambulation     MANUAL  (CPT Code 25373) TOTAL TIME FOR SESSION Not performed Completed   Stretching by therapist/PROM     Mobilization      MODALITIES   TOTAL TIME FOR SESSION Not performed Completed   Ice     Heat     ATTENDED E-STIM  (CPT Code 00951) TOTAL TIME FOR SESSION Not performed Completed   Attended E-Stim

## 2025-02-14 ENCOUNTER — HOSPITAL ENCOUNTER (OUTPATIENT)
Dept: PHYSICAL THERAPY | Facility: REHABILITATION | Age: 75
Setting detail: THERAPIES SERIES
Discharge: HOME | End: 2025-02-14
Attending: PSYCHIATRY & NEUROLOGY
Payer: MEDICARE

## 2025-02-14 DIAGNOSIS — Z74.09 IMPAIRED FUNCTIONAL MOBILITY, BALANCE, GAIT, AND ENDURANCE: ICD-10-CM

## 2025-02-14 DIAGNOSIS — R26.81 UNSTEADINESS ON FEET: Primary | ICD-10-CM

## 2025-02-14 PROCEDURE — 97530 THERAPEUTIC ACTIVITIES: CPT | Mod: GP,CQ

## 2025-02-14 PROCEDURE — 97116 GAIT TRAINING THERAPY: CPT | Mod: GP,CQ

## 2025-02-14 PROCEDURE — 97112 NEUROMUSCULAR REEDUCATION: CPT | Mod: GP,CQ

## 2025-02-14 NOTE — OP PT TREATMENT LOG
PT Neuro Exercises Current Session Time Completed   THER ACT   (CPT Code 09362) TOTAL TIME FOR SESSION 8-22 Minutes    Monitored Vitals, Pain Monitored  Hx of varied feelings of lightheadedness    Transfer training     HEP 2/14 given with good understanding of form and frequency  - SLS in corner for safety  - side stepping GTB around knees Yes    Orthotic assessment NA    Patient Education POC, role of PT, safety, HEP semi tandem and how to progress with HHT and VHT    Pt education on the importance of practicing newly learned gait mechanics as it relates to progression throughout therapy. Pt verbalized understanding and agreed       Yes    THER EX  (CPT Code 47609) TOTAL TIME FOR SESSION 0-7 Minutes Completed   STRETCHING     Stretching  Hamstrings   DF slant board    Supine There-Ex Clamshells  Bridges  SLR  S/L hip abd    Seated Ther-Ex     Standing There-Ex Side stepping GTB around knees  - 2 laps in //bars Yes    Nu Step (time/level/SPM) Warm up     Stationary Bike     Treadmill With varied UE support     NEURO RE-ED  (CPT Code 80991) TOTAL TIME FOR SESSION 8-22 Minutes Completed   Balance Training Rockerboard    Airex    Split Stance    SLS block practice  - improved to 15 sec CGA             Yes    Coordination 4 square    Hurdles    Agility ladder    Pre Gait Activities Block tapping    Kianna taps  - no UE support    Kianna step over   - no UE support     Yes       Yes    Postural Re-Education Back against wall, open books, PPT  Chin tucks  Rows  Palloff press    Outcome Assessments Mendoza      GAIT TRAINING  (CPT Code 76780) TOTAL TIME FOR SESSION 8-22 Minutes Completed   Ambulation No AD  - focus on narrowing YANA  - some scissoring in R LE, improved with cueing Yes    Walking Assessments WBOS and unsteady  R LE scissoring when narrowed YANA    Stair negotiation Reciprocal HR on R when ascending Yes    Curb negotiation CGA R LE WB Yes    Ramp negotiation CGA, felt unsteady in descending Yes    Outdoor  ambulation     MANUAL  (CPT Code 74315) TOTAL TIME FOR SESSION Not performed Completed   Stretching by therapist/PROM     Mobilization      MODALITIES   TOTAL TIME FOR SESSION Not performed Completed   Ice     Heat     ATTENDED E-STIM  (CPT Code 62289) TOTAL TIME FOR SESSION Not performed Completed   Attended E-Stim

## 2025-02-14 NOTE — PROGRESS NOTES
Physical Therapy Visit    PT DAILY NOTE FOR OUTPATIENT THERAPY    Patient: Roberth Amato MRN: [id number]  : 1950 74 y.o.  Referring Physician: Augusto Murray DO  Date of Visit: 2025    Certification Dates: 25 through 25     Diagnosis:   1. Unsteadiness on feet    2. Impaired functional mobility, balance, gait, and endurance        Chief Complaints:       Precautions:   Precautions comments: diabetic neuropathy      TODAY'S VISIT    Time In Session:  Start Time: 0950  Stop Time: 1045  Time Calculation (min): 55 min   History/Vitals/Pain/Encounter Info - 25 0935          Injury History/Precautions/Daily Required Info    Document Type daily treatment     Primary Therapist Blake Alicia, PT     Onset of Illness/Injury or Date of Surgery 10/07/24     Referring Physician Dr. Murray     Precautions comments diabetic neuropathy     History of present illness/functional impairment Destin is a 73 yo M with a PMH of AAA, DM Type II, stage III kidney disease, CAD, s/p CABGx4 2021, neuropathy.s/p C2-7 PDF 10/7/24 at [facility], with DC on 10/11/24 due to cervical myelopathy. DC home without homecare. Since then he has been going to the gym. Pt continues to have some minor hand tingling complaints and continues to have his baseline diabetic neuropathy in feet. Eager to start PT.     Patient/Family/Caregiver Comments/Observations pt was recieved with no c/o pain. reports unsteadiness when walking     Patient reported fall since last visit No        Pain Assessment    Currently in pain No/Denies        Pre Activity Vital Signs    Pulse 56     /53     BP Location Left upper arm     BP Method Automatic     Patient Position Sitting                    Daily Treatment Assessment and Plan - 25 0935          Daily Treatment Assessment and Plan    Daily Outcome Summary pt performed curb, stairs and ramp as per LOG. pt was recieved with WBOS with gait and unsteadiness. pt  focus SLS and tandem stance to help improve stability and safety in stance, pt was challenged with SLS with2-3 sec holds and increased postural sway, this improved to 15 sec holds after demo and practice, some difficulty with decreased sensation in B feet which was improved with mirror for visual feedback and may benefit from naboso trial. this translated well to pregait and gait with narrowing YANA, some difficulty with R LE placement with gait with occasional scissoring and LOB, improved with cueing. pt given HEP update with SLS incorner for safety as well asresited side stepping to help imrpove stability and asfety, pt showed good understanding of form and frequency     Plan and Recommendations Floor transfers. Balance                          OBJECTIVE DATA TAKEN TODAY:    None taken    Today's Treatment:    PT Neuro Exercises Current Session Time Completed   THER ACT   (CPT Code 90187) TOTAL TIME FOR SESSION 8-22 Minutes    Monitored Vitals, Pain Monitored  Hx of varied feelings of lightheadedness    Transfer training     HEP 2/14 given with good understanding of form and frequency  - SLS in corner for safety  - side stepping GTB around knees Yes    Orthotic assessment NA    Patient Education POC, role of PT, safety, HEP semi tandem and how to progress with HHT and VHT    Pt education on the importance of practicing newly learned gait mechanics as it relates to progression throughout therapy. Pt verbalized understanding and agreed       Yes    THER EX  (CPT Code 24923) TOTAL TIME FOR SESSION 0-7 Minutes Completed   STRETCHING     Stretching  Hamstrings   DF slant board    Supine There-Ex Clamshells  Bridges  SLR  S/L hip abd    Seated Ther-Ex     Standing There-Ex Side stepping GTB around knees  - 2 laps in //bars Yes    Nu Step (time/level/SPM) Warm up     Stationary Bike     Treadmill With varied UE support     NEURO RE-ED  (CPT Code 26302) TOTAL TIME FOR SESSION 8-22 Minutes Completed   Balance Training  Rockerboard    Airex    Split Stance    SLS block practice  - improved to 15 sec CGA             Yes    Coordination 4 square    Hurdles    Agility ladder    Pre Gait Activities Block tapping    Kainna taps  - no UE support    Kianna step over   - no UE support     Yes       Yes    Postural Re-Education Back against wall, open books, PPT  Chin tucks  Rows  Palloff press    Outcome Assessments Mendoza      GAIT TRAINING  (CPT Code 55946) TOTAL TIME FOR SESSION 8-22 Minutes Completed   Ambulation No AD  - focus on narrowing YANA  - some scissoring in R LE, improved with cueing Yes    Walking Assessments WBOS and unsteady  R LE scissoring when narrowed YANA    Stair negotiation Reciprocal HR on R when ascending Yes    Curb negotiation CGA R LE WB Yes    Ramp negotiation CGA, felt unsteady in descending Yes    Outdoor ambulation     MANUAL  (CPT Code 96602) TOTAL TIME FOR SESSION Not performed Completed   Stretching by therapist/PROM     Mobilization      MODALITIES   TOTAL TIME FOR SESSION Not performed Completed   Ice     Heat     ATTENDED E-STIM  (CPT Code 90080) TOTAL TIME FOR SESSION Not performed Completed   Attended E-Stim

## 2025-02-19 ENCOUNTER — HOSPITAL ENCOUNTER (OUTPATIENT)
Dept: PHYSICAL THERAPY | Facility: REHABILITATION | Age: 75
Setting detail: THERAPIES SERIES
Discharge: HOME | End: 2025-02-19
Attending: PSYCHIATRY & NEUROLOGY
Payer: MEDICARE

## 2025-02-19 DIAGNOSIS — Z74.09 IMPAIRED FUNCTIONAL MOBILITY, BALANCE, GAIT, AND ENDURANCE: ICD-10-CM

## 2025-02-19 DIAGNOSIS — R26.81 UNSTEADINESS ON FEET: Primary | ICD-10-CM

## 2025-02-19 PROCEDURE — 97112 NEUROMUSCULAR REEDUCATION: CPT | Mod: GP

## 2025-02-19 PROCEDURE — 97110 THERAPEUTIC EXERCISES: CPT | Mod: GP

## 2025-02-19 NOTE — PROGRESS NOTES
Physical Therapy Visit    PT DAILY NOTE FOR OUTPATIENT THERAPY    Patient: Roberth Amato MRN: [id number]  : 1950 74 y.o.  Referring Physician: Augusto Murray DO  Date of Visit: 2025    Certification Dates: 25 through 25     Diagnosis:   1. Unsteadiness on feet    2. Impaired functional mobility, balance, gait, and endurance        Chief Complaints:       Precautions:   Precautions comments: diabetic neuropathy      TODAY'S VISIT    Time In Session:  Start Time: 09  Stop Time: 1000  Time Calculation (min): 60 min   History/Vitals/Pain/Encounter Info - 25 0900          Injury History/Precautions/Daily Required Info    Document Type daily treatment     Primary Therapist Blake Alicia, PT     Onset of Illness/Injury or Date of Surgery 10/07/24     Referring Physician Dr. Murray     Precautions comments diabetic neuropathy     History of present illness/functional impairment Destin is a 73 yo M with a PMH of AAA, DM Type II, stage III kidney disease, CAD, s/p CABGx4 2021, neuropathy.s/p C2-7 PDF 10/7/24 at [facility], with DC on 10/11/24 due to cervical myelopathy. DC home without homecare. Since then he has been going to the gym. Pt continues to have some minor hand tingling complaints and continues to have his baseline diabetic neuropathy in feet. Eager to start PT.     Patient/Family/Caregiver Comments/Observations pt reports he had an EMG yesterday and that his neurologist will call him soon with results. next week pt has an MRI of the brain     Patient reported fall since last visit No        Pain Assessment    Currently in pain No/Denies        Pre Activity Vital Signs    Pulse 62     /68                    Daily Treatment Assessment and Plan - 25 0900          Daily Treatment Assessment and Plan    Daily Outcome Summary Destin presents to PT session with excellent work ethic and cooperation. Pt tolerated session well and had improved coordination  "with pre and post testing using B naboso inserts (L gray) with hank and block activities and with ambulation. Pt with occasional varied step length and width but able to improve more narrow YANA with cues and practice. Cont to work on core and proximal LE strengthening. Pt took picture of inserts and will look into them online but advised they don't have to buy yet. Improved hank step overs with cues for increasing hip and knee flx, with more attention and concentration to task and weight shifting, balance and technique improved.     Plan and Recommendations Floor transfers. Balance                          OBJECTIVE DATA TAKEN TODAY:    None taken    Today's Treatment:    PT Neuro Exercises Current Session Time Completed   THER ACT   (CPT Code 33195) TOTAL TIME FOR SESSION 0-7 Minutes    Monitored Vitals, Pain Monitored  Hx of varied feelings of lightheadedness    Transfer training     HEP 2/14 given with good understanding of form and frequency  - SLS in corner for safety  - side stepping GTB around knees    Orthotic assessment Naboso inserts(L leija) trialed today in B shoes) Yes    Patient Education POC, role of PT, safety, HEP semi tandem and how to progress with HHT and VHT    Pt education on the importance of practicing newly learned gait mechanics as it relates to progression throughout therapy. Pt verbalized understanding and agreed    Neurologist appt yesterday with Dr Murray (R hand and leg EMG). Next week he has an MRI of the brain                 Yes    THER EX  (CPT Code 59158) TOTAL TIME FOR SESSION 23-37 Minutes Completed   STRETCHING     Stretching  Hamstrings seated with foot on 6\" block, B 1min x 2sets  DF slant board, slant board #2, 2min   Seated figure 4, 1min B Yes  Yes    Supine There-Ex Clamshells  Bridges  SLR  S/L hip abd    Seated Ther-Ex     Standing There-Ex Side stepping GTB around knees  - 2 laps in //bars    Heel raises from slant board #2       Yes    Nu Step (time/level/SPM) " "Seat #12, LE only, 5min Yes    Stationary Bike     Treadmill With varied UE support     NEURO RE-ED  (CPT Code 00049) TOTAL TIME FOR SESSION 23-37 Minutes Completed   Balance Training Rockerboard    Airex    Split Stance    SLS block practice  - improved to 15 sec CGA                Coordination 4 square (PVC piping); CW 8x and CCW 8x    Kianna: single step over, 10x R 10x L    6\" hurdles: forwards , laterally, alt LE lead, repeated     Agility ladder    Intermittent laps in between NMRE to focus on more narrow YANA and use of naboso inserts in Yes    Yes    Yes        Yes    Pre Gait Activities Block tappin\" block 20x without insert then 20x with insert, 10x 6\" block taps B heel only  Kianna taps  - no UE support    Kianna step over   - no UE support Yes      Postural Re-Education Back against wall, open books, PPT  Chin tucks  Rows  Palloff press    Outcome Assessments Mendoza      GAIT TRAINING  (CPT Code 57668) TOTAL TIME FOR SESSION 0-7 Minutes Completed   Ambulation No AD  - focus on narrowing YANA  - some scissoring in R LE, improved with cueing    Walking Assessments WBOS and unsteady  R LE scissoring when narrowed YANA    Stair negotiation Reciprocal HR on R when ascending    Curb negotiation CGA R LE WB    Ramp negotiation CGA, felt unsteady in descending    Outdoor ambulation     MANUAL  (CPT Code 23705) TOTAL TIME FOR SESSION Not performed Completed   Stretching by therapist/PROM     Mobilization      MODALITIES   TOTAL TIME FOR SESSION Not performed Completed   Ice     Heat     ATTENDED E-STIM  (CPT Code 27493) TOTAL TIME FOR SESSION Not performed Completed   Attended E-Stim                               "

## 2025-02-19 NOTE — OP PT TREATMENT LOG
"PT Neuro Exercises Current Session Time Completed   THER ACT   (CPT Code 08082) TOTAL TIME FOR SESSION 0-7 Minutes    Monitored Vitals, Pain Monitored  Hx of varied feelings of lightheadedness    Transfer training     HEP  given with good understanding of form and frequency  - SLS in corner for safety  - side stepping GTB around knees    Orthotic assessment Naboso inserts(L leija) trialed today in B shoes) Yes    Patient Education POC, role of PT, safety, HEP semi tandem and how to progress with HHT and VHT    Pt education on the importance of practicing newly learned gait mechanics as it relates to progression throughout therapy. Pt verbalized understanding and agreed    Neurologist appt yesterday with Dr Murray (R hand and leg EMG). Next week he has an MRI of the brain                 Yes    THER EX  (CPT Code 67392) TOTAL TIME FOR SESSION 23-37 Minutes Completed   STRETCHING     Stretching  Hamstrings seated with foot on 6\" block, B 1min x 2sets  DF slant board, slant board #2, 2min   Seated figure 4, 1min B Yes  Yes    Supine There-Ex Clamshells  Bridges  SLR  S/L hip abd    Seated Ther-Ex     Standing There-Ex Side stepping GTB around knees  - 2 laps in //bars    Heel raises from slant board #2       Yes    Nu Step (time/level/SPM) Seat #12, LE only, 5min Yes    Stationary Bike     Treadmill With varied UE support     NEURO RE-ED  (CPT Code 07198) TOTAL TIME FOR SESSION 23-37 Minutes Completed   Balance Training Rockerboard    Airex    Split Stance    SLS block practice  - improved to 15 sec CGA                Coordination 4 square (PVC piping); CW 8x and CCW 8x    Kianna: single step over, 10x R 10x L    6\" hurdles: forwards , laterally, alt LE lead, repeated     Agility ladder    Intermittent laps in between NMRE to focus on more narrow YANA and use of naboso inserts in Yes    Yes    Yes        Yes    Pre Gait Activities Block tappin\" block 20x without insert then 20x with insert, 10x 6\" block taps B " heel only  Kianna taps  - no UE support    Kianna step over   - no UE support Yes      Postural Re-Education Back against wall, open books, PPT  Chin tucks  Rows  Palloff press    Outcome Assessments Mendoza      GAIT TRAINING  (CPT Code 27422) TOTAL TIME FOR SESSION 0-7 Minutes Completed   Ambulation No AD  - focus on narrowing YANA  - some scissoring in R LE, improved with cueing    Walking Assessments WBOS and unsteady  R LE scissoring when narrowed YANA    Stair negotiation Reciprocal HR on R when ascending    Curb negotiation CGA R LE WB    Ramp negotiation CGA, felt unsteady in descending    Outdoor ambulation     MANUAL  (CPT Code 55688) TOTAL TIME FOR SESSION Not performed Completed   Stretching by therapist/PROM     Mobilization      MODALITIES   TOTAL TIME FOR SESSION Not performed Completed   Ice     Heat     ATTENDED E-STIM  (CPT Code 41175) TOTAL TIME FOR SESSION Not performed Completed   Attended E-Stim

## 2025-02-21 ENCOUNTER — TELEPHONE (OUTPATIENT)
Dept: REGISTRATION | Facility: REHABILITATION | Age: 75
End: 2025-02-21
Payer: MEDICARE

## 2025-02-26 ENCOUNTER — HOSPITAL ENCOUNTER (OUTPATIENT)
Dept: PHYSICAL THERAPY | Facility: REHABILITATION | Age: 75
Setting detail: THERAPIES SERIES
Discharge: HOME | End: 2025-02-26
Attending: PSYCHIATRY & NEUROLOGY
Payer: MEDICARE

## 2025-02-26 DIAGNOSIS — R26.81 UNSTEADINESS ON FEET: Primary | ICD-10-CM

## 2025-02-26 DIAGNOSIS — Z74.09 IMPAIRED FUNCTIONAL MOBILITY, BALANCE, GAIT, AND ENDURANCE: ICD-10-CM

## 2025-02-26 PROCEDURE — 97530 THERAPEUTIC ACTIVITIES: CPT | Mod: GP,CQ

## 2025-02-26 PROCEDURE — 97116 GAIT TRAINING THERAPY: CPT | Mod: GP,CQ

## 2025-02-26 PROCEDURE — 97112 NEUROMUSCULAR REEDUCATION: CPT | Mod: GP,CQ

## 2025-02-26 NOTE — PROGRESS NOTES
Physical Therapy Visit    PT DAILY NOTE FOR OUTPATIENT THERAPY    Patient: Roberth Amato MRN: [id number]  : 1950 74 y.o.  Referring Physician: Augusto Murray DO  Date of Visit: 2025    Certification Dates: 25 through 25     Diagnosis:   1. Unsteadiness on feet    2. Impaired functional mobility, balance, gait, and endurance        Chief Complaints:       Precautions:   Precautions comments: diabetic neuropathy      TODAY'S VISIT    Time In Session:  Start Time: 1002  Stop Time: 1055  Time Calculation (min): 53 min   History/Vitals/Pain/Encounter Info - 25 0958          Injury History/Precautions/Daily Required Info    Document Type daily treatment     Primary Therapist Blake Alicia, PT     Onset of Illness/Injury or Date of Surgery 10/07/24     Referring Physician Dr. Murray     Precautions comments diabetic neuropathy     History of present illness/functional impairment Destin is a 73 yo M with a PMH of AAA, DM Type II, stage III kidney disease, CAD, s/p CABGx4 2021, neuropathy.s/p C2-7 PDF 10/7/24 at [facility], with DC on 10/11/24 due to cervical myelopathy. DC home without homecare. Since then he has been going to the gym. Pt continues to have some minor hand tingling complaints and continues to have his baseline diabetic neuropathy in feet. Eager to start PT.     Patient/Family/Caregiver Comments/Observations pt was recieved with no c.o pain     Patient reported fall since last visit No        Pain Assessment    Currently in pain No/Denies        Pre Activity Vital Signs    Pulse 50     /81     BP Location Left upper arm     BP Method Automatic     Patient Position Sitting                    Daily Treatment Assessment and Plan - 25 0958          Daily Treatment Assessment and Plan    Daily Outcome Summary Review of HEP with some correction in form with side stepping to avoid compensatory patterns, improved understanding after. pt continued  "with improving gait by narrowing YANA with decreased compensatory trendelenburg in R stance with focus, increased with distraction or fatigue.     Plan and Recommendations cont with gray naboso trials. core and proximal LE strengthening. Floor transfers. Balance                          OBJECTIVE DATA TAKEN TODAY:    None taken    Today's Treatment:    PT Neuro Exercises Current Session Time Completed   THER ACT   (CPT Code 44483) TOTAL TIME FOR SESSION 8-22 Minutes    Monitored Vitals, Pain Monitored  Hx of varied feelings of lightheadedness    Transfer training     HEP 2/14 given with good understanding of form and frequency  - SLS in corner for safety  - side stepping GTB around knees    HEP review with good understanding of form and frequency           Yes   Orthotic assessment Naboso inserts(L leija) trialed today in B shoes) Yes    Patient Education POC, role of PT, safety, HEP semi tandem and how to progress with HHT and VHT    Pt education on the importance of practicing newly learned gait mechanics outside of therapy as well as block practice on TM as it relates to progression throughout therapy. Pt verbalized understanding and agreed    Neurologist appt yesterday with Dr Murray (R hand and leg EMG). Next week he has an MRI of the brain       Yes    THER EX  (CPT Code 83164) TOTAL TIME FOR SESSION 0-7 Minutes Completed   STRETCHING     Stretching  Hamstrings seated with foot on 6\" block, B 1min x 2sets  DF slant board, slant board #2, 2min   Seated figure 4, 1min B    Supine There-Ex Clamshells  Bridges  SLR  S/L hip abd    Seated Ther-Ex     Standing There-Ex Side stepping GTB around knees  - 2 laps in //bars    Heel raises from slant board #2          Nu Step (time/level/SPM) Seat #12, LE only, 5min    Stationary Bike     Treadmill Focus on narrowing YANA with B UE support 6 min Yes    NEURO RE-ED  (CPT Code 98575) TOTAL TIME FOR SESSION 23-37 Minutes Completed   Balance Training " "Rockerboard    Airex    Split Stance    SLS block practice  - improved to 15 sec CGA    Tandem   - static  - fwd/bkw WS  - walking fwd/bkw             Yes       Yes    Coordination 4 square (PVC piping); CW 8x and CCW 8x    Kianna: single step over, 10x R 10x L    6\" hurdles: forwards , laterally, alt LE lead, repeated     Agility ladder    Intermittent laps in between NMRE to focus on more narrow YANA and use of naboso inserts in    Pre Gait Activities Block tappin\" block 20x without insert then 20x with insert, 10x 6\" block taps B heel only  Kianna taps  - no UE support    Kianna step over   - no UE support    Postural Re-Education Back against wall, open books, PPT  Chin tucks  Rows  Palloff press    Outcome Assessments Mendoza      GAIT TRAINING  (CPT Code 19854) TOTAL TIME FOR SESSION 8-22 Minutes Completed   Ambulation No AD  - focus on narrowing YANA  - some scissoring in R LE, improved with cueing Yes    Walking Assessments WBOS and unsteady  R LE scissoring when narrowed YANA    Stair negotiation Reciprocal HR on R when ascending    Curb negotiation CGA R LE WB    Ramp negotiation CGA, felt unsteady in descending    Outdoor ambulation     MANUAL  (CPT Code 18072) TOTAL TIME FOR SESSION Not performed Completed   Stretching by therapist/PROM     Mobilization      MODALITIES   TOTAL TIME FOR SESSION Not performed Completed   Ice     Heat     ATTENDED E-STIM  (CPT Code 43917) TOTAL TIME FOR SESSION Not performed Completed   Attended E-Stim                               "

## 2025-02-26 NOTE — OP PT TREATMENT LOG
"PT Neuro Exercises Current Session Time Completed   THER ACT   (CPT Code 18380) TOTAL TIME FOR SESSION 8-22 Minutes    Monitored Vitals, Pain Monitored  Hx of varied feelings of lightheadedness    Transfer training     HEP 2/14 given with good understanding of form and frequency  - SLS in corner for safety  - side stepping GTB around knees    HEP review with good understanding of form and frequency           Yes   Orthotic assessment Naboso inserts(L leija) trialed today in B shoes) Yes    Patient Education POC, role of PT, safety, HEP semi tandem and how to progress with HHT and VHT    Pt education on the importance of practicing newly learned gait mechanics outside of therapy as well as block practice on TM as it relates to progression throughout therapy. Pt verbalized understanding and agreed    Neurologist appt yesterday with Dr Murray (R hand and leg EMG). Next week he has an MRI of the brain       Yes    THER EX  (CPT Code 67836) TOTAL TIME FOR SESSION 0-7 Minutes Completed   STRETCHING     Stretching  Hamstrings seated with foot on 6\" block, B 1min x 2sets  DF slant board, slant board #2, 2min   Seated figure 4, 1min B    Supine There-Ex Clamshells  Bridges  SLR  S/L hip abd    Seated Ther-Ex     Standing There-Ex Side stepping GTB around knees  - 2 laps in //bars    Heel raises from slant board #2          Nu Step (time/level/SPM) Seat #12, LE only, 5min    Stationary Bike     Treadmill Focus on narrowing YANA with B UE support 6 min Yes    NEURO RE-ED  (CPT Code 39998) TOTAL TIME FOR SESSION 23-37 Minutes Completed   Balance Training Rockerboard    Airex    Split Stance    SLS block practice  - improved to 15 sec CGA    Tandem   - static  - fwd/bkw WS  - walking fwd/bkw             Yes       Yes    Coordination 4 square (PVC piping); CW 8x and CCW 8x    Kianna: single step over, 10x R 10x L    6\" hurdles: forwards , laterally, alt LE lead, repeated     Agility ladder    Intermittent laps in between NMRE to " "focus on more narrow YANA and use of naboso inserts in    Pre Gait Activities Block tappin\" block 20x without insert then 20x with insert, 10x 6\" block taps B heel only  Kianna taps  - no UE support    Kianna step over   - no UE support    Postural Re-Education Back against wall, open books, PPT  Chin tucks  Rows  Palloff press    Outcome Assessments Mendoza      GAIT TRAINING  (CPT Code 35530) TOTAL TIME FOR SESSION 8-22 Minutes Completed   Ambulation No AD  - focus on narrowing YANA  - some scissoring in R LE, improved with cueing Yes    Walking Assessments WBOS and unsteady  R LE scissoring when narrowed YANA    Stair negotiation Reciprocal HR on R when ascending    Curb negotiation CGA R LE WB    Ramp negotiation CGA, felt unsteady in descending    Outdoor ambulation     MANUAL  (CPT Code 60995) TOTAL TIME FOR SESSION Not performed Completed   Stretching by therapist/PROM     Mobilization      MODALITIES   TOTAL TIME FOR SESSION Not performed Completed   Ice     Heat     ATTENDED E-STIM  (CPT Code 76008) TOTAL TIME FOR SESSION Not performed Completed   Attended E-Stim        "

## 2025-03-03 ENCOUNTER — HOSPITAL ENCOUNTER (OUTPATIENT)
Dept: PHYSICAL THERAPY | Facility: REHABILITATION | Age: 75
Setting detail: THERAPIES SERIES
Discharge: HOME | End: 2025-03-03
Attending: PSYCHIATRY & NEUROLOGY
Payer: MEDICARE

## 2025-03-03 DIAGNOSIS — Z74.09 IMPAIRED FUNCTIONAL MOBILITY, BALANCE, GAIT, AND ENDURANCE: ICD-10-CM

## 2025-03-03 DIAGNOSIS — R26.81 UNSTEADINESS ON FEET: Primary | ICD-10-CM

## 2025-03-03 PROCEDURE — 97112 NEUROMUSCULAR REEDUCATION: CPT | Mod: GP,CQ

## 2025-03-03 PROCEDURE — 97530 THERAPEUTIC ACTIVITIES: CPT | Mod: GP,CQ

## 2025-03-03 NOTE — OP PT TREATMENT LOG
"PT Neuro Exercises Current Session Time Completed   THER ACT   (CPT Code 18273) TOTAL TIME FOR SESSION 8-22 Minutes    Monitored Vitals, Pain Monitored  Hx of varied feelings of lightheadedness    Transfer training     HEP 2/14 given with good understanding of form and frequency  - SLS in corner for safety  - side stepping GTB around knees    HEP review with good understanding of form and frequency    3/3 HEP update with moving GTB to mid calf for increased resistance to help improve stability in stance, pt showed good understanding of form and frequency    3/3 review Hs and gastroc stretch for home stephania with good understanding of form and frequency                 Yes         Yes    Orthotic assessment Naboso inserts(L leija) trialed today in B shoes)    Patient Education POC, role of PT, safety, HEP semi tandem and how to progress with HHT and VHT    Pt education on the importance of practicing newly learned gait mechanics outside of therapy as well as block practice on TM as it relates to progression throughout therapy. Pt verbalized understanding and agreed    Neurologist appt yesterday with Dr Murray (R hand and leg EMG). Next week he has an MRI of the brain       Yes    THER EX  (CPT Code 43884) TOTAL TIME FOR SESSION Not performed Completed   STRETCHING     Stretching  Hamstrings seated with foot on 6\" block, B 1min x 2sets  DF slant board, slant board #2, 2min   Seated figure 4, 1min B    Supine There-Ex Clamshells  Bridges  SLR  S/L hip abd    Seated Ther-Ex     Standing There-Ex Side stepping GTB around knees  - 2 laps in //bars    Heel raises from slant board #2          Nu Step (time/level/SPM) Seat #12, LE only, 5min    Stationary Bike     Treadmill Focus on narrowing YANA with B UE support 6 min    NEURO RE-ED  (CPT Code 27285) TOTAL TIME FOR SESSION 38-52 Minutes Completed   Balance Training Rockerboard    Airex    Split Stance    SLS block practice  - improved to 15 sec CGA    Tandem   - static  - " "fwd/bkw WS  - walking fwd/bkw             Yes       Yes    Coordination 4 square (PVC piping); CW 8x and CCW 8x    Kianna: single step over, 10x R 10x L    6\" hurdles: forwards , laterally, alt LE lead, repeated     Agility ladder    Intermittent laps in between NMRE to focus on more narrow YANA and use of naboso inserts in    Pre Gait Activities Block tappin\" block 20x without insert then 20x with insert, 10x 6\" block taps B heel only  Kianna taps  - no UE support    Kianna step over   - no UE support    Postural Re-Education Back against wall, open books, PPT  Chin tucks  Rows  Palloff press    Outcome Assessments Mendoza      GAIT TRAINING  (CPT Code 18154) TOTAL TIME FOR SESSION Not performed Completed   Ambulation No AD  - focus on narrowing YANA  - some scissoring in R LE, improved with cueing    Walking Assessments WBOS and unsteady  R LE scissoring when narrowed YANA    Stair negotiation Reciprocal HR on R when ascending    Curb negotiation CGA R LE WB    Ramp negotiation CGA, felt unsteady in descending    Outdoor ambulation     MANUAL  (CPT Code 80372) TOTAL TIME FOR SESSION Not performed Completed   Stretching by therapist/PROM     Mobilization      MODALITIES   TOTAL TIME FOR SESSION Not performed Completed   Ice     Heat     ATTENDED E-STIM  (CPT Code 27019) TOTAL TIME FOR SESSION Not performed Completed   Attended E-Stim        "

## 2025-03-03 NOTE — PROGRESS NOTES
Physical Therapy Visit    PT DAILY NOTE FOR OUTPATIENT THERAPY    Patient: Roberth Amato MRN: [id number]  : 1950 74 y.o.  Referring Physician: Augusto Murray DO  Date of Visit: 3/3/2025    Certification Dates: 25 through 25     Diagnosis:   1. Unsteadiness on feet    2. Impaired functional mobility, balance, gait, and endurance        Chief Complaints:       Precautions:   Precautions comments: diabetic neuropathy      TODAY'S VISIT    Time In Session:  Start Time: 1001  Stop Time: 1054  Time Calculation (min): 53 min   History/Vitals/Pain/Encounter Info - 25 0952          Injury History/Precautions/Daily Required Info    Document Type daily treatment     Primary Therapist Blake Alicia, PT     Onset of Illness/Injury or Date of Surgery 10/07/24     Referring Physician Dr. Murray     Precautions comments diabetic neuropathy     History of present illness/functional impairment Destin is a 75 yo M with a PMH of AAA, DM Type II, stage III kidney disease, CAD, s/p CABGx4 2021, neuropathy.s/p C2-7 PDF 10/7/24 at [facility], with DC on 10/11/24 due to cervical myelopathy. DC home without homecare. Since then he has been going to the gym. Pt continues to have some minor hand tingling complaints and continues to have his baseline diabetic neuropathy in feet. Eager to start PT.     Patient/Family/Caregiver Comments/Observations pt was received with no c/o pain. repots goignt o the gym 5 days a week with goal of 90 minutes. arrived with new Naboso inserts.     Patient reported fall since last visit No        Pain Assessment    Currently in pain No/Denies        Pre Activity Vital Signs    Pulse 48     /61     BP Location Left upper arm     BP Method Automatic     Patient Position Sitting                    Daily Treatment Assessment and Plan - 25 0952          Daily Treatment Assessment and Plan    Plan and Recommendations cont with gray naboso trials. core and  "proximal LE strengthening. Floor transfers. Balance                          OBJECTIVE DATA TAKEN TODAY:    None taken    Today's Treatment:    PT Neuro Exercises Current Session Time Completed   THER ACT   (CPT Code 99489) TOTAL TIME FOR SESSION 8-22 Minutes    Monitored Vitals, Pain Monitored  Hx of varied feelings of lightheadedness    Transfer training     HEP 2/14 given with good understanding of form and frequency  - SLS in corner for safety  - side stepping GTB around knees    HEP review with good understanding of form and frequency    3/3 HEP update with moving GTB to mid calf for increased resistance to help improve stability in stance, pt showed good understanding of form and frequency    3/3 review Hs and gastroc stretch for home stephania with good understanding of form and frequency                 Yes         Yes    Orthotic assessment Naboso inserts(L leija) trialed today in B shoes)    Patient Education POC, role of PT, safety, HEP semi tandem and how to progress with HHT and VHT    Pt education on the importance of practicing newly learned gait mechanics outside of therapy as well as block practice on TM as it relates to progression throughout therapy. Pt verbalized understanding and agreed    Neurologist appt yesterday with Dr Murray (R hand and leg EMG). Next week he has an MRI of the brain       Yes    THER EX  (CPT Code 86797) TOTAL TIME FOR SESSION Not performed Completed   STRETCHING     Stretching  Hamstrings seated with foot on 6\" block, B 1min x 2sets  DF slant board, slant board #2, 2min   Seated figure 4, 1min B    Supine There-Ex Clamshells  Bridges  SLR  S/L hip abd    Seated Ther-Ex     Standing There-Ex Side stepping GTB around knees  - 2 laps in //bars    Heel raises from slant board #2          Nu Step (time/level/SPM) Seat #12, LE only, 5min    Stationary Bike     Treadmill Focus on narrowing YANA with B UE support 6 min    NEURO RE-ED  (CPT Code 78718) TOTAL TIME FOR SESSION 38-52 " "Minutes Completed   Balance Training Rockerboard    Airex    Split Stance    SLS block practice  - improved to 15 sec CGA    Tandem   - static  - fwd/bkw WS  - walking fwd/bkw             Yes       Yes    Coordination 4 square (PVC piping); CW 8x and CCW 8x    Kianna: single step over, 10x R 10x L    6\" hurdles: forwards , laterally, alt LE lead, repeated     Agility ladder    Intermittent laps in between NMRE to focus on more narrow YANA and use of naboso inserts in    Pre Gait Activities Block tappin\" block 20x without insert then 20x with insert, 10x 6\" block taps B heel only  Kianna taps  - no UE support    Kianna step over   - no UE support    Postural Re-Education Back against wall, open books, PPT  Chin tucks  Rows  Palloff press    Outcome Assessments Mendoza      GAIT TRAINING  (CPT Code 44807) TOTAL TIME FOR SESSION Not performed Completed   Ambulation No AD  - focus on narrowing YANA  - some scissoring in R LE, improved with cueing    Walking Assessments WBOS and unsteady  R LE scissoring when narrowed YANA    Stair negotiation Reciprocal HR on R when ascending    Curb negotiation CGA R LE WB    Ramp negotiation CGA, felt unsteady in descending    Outdoor ambulation     MANUAL  (CPT Code 30031) TOTAL TIME FOR SESSION Not performed Completed   Stretching by therapist/PROM     Mobilization      MODALITIES   TOTAL TIME FOR SESSION Not performed Completed   Ice     Heat     ATTENDED E-STIM  (CPT Code 47943) TOTAL TIME FOR SESSION Not performed Completed   Attended E-Stim                               "

## 2025-03-10 ENCOUNTER — HOSPITAL ENCOUNTER (OUTPATIENT)
Dept: PHYSICAL THERAPY | Facility: REHABILITATION | Age: 75
Setting detail: THERAPIES SERIES
Discharge: HOME | End: 2025-03-10
Attending: PSYCHIATRY & NEUROLOGY
Payer: MEDICARE

## 2025-03-10 DIAGNOSIS — R26.81 UNSTEADINESS ON FEET: Primary | ICD-10-CM

## 2025-03-10 DIAGNOSIS — Z74.09 IMPAIRED FUNCTIONAL MOBILITY, BALANCE, GAIT, AND ENDURANCE: ICD-10-CM

## 2025-03-10 PROCEDURE — 97110 THERAPEUTIC EXERCISES: CPT | Mod: GP

## 2025-03-10 PROCEDURE — 97530 THERAPEUTIC ACTIVITIES: CPT | Mod: GP

## 2025-03-10 PROCEDURE — 97112 NEUROMUSCULAR REEDUCATION: CPT | Mod: GP

## 2025-03-10 ASSESSMENT — BALANCE ASSESSMENTS: TOTAL SCORE: 48

## 2025-03-10 NOTE — OP PT TREATMENT LOG
"PT Neuro Exercises Current Session Time Completed   THER ACT   (CPT Code 73836) TOTAL TIME FOR SESSION 8-22 Minutes    Monitored Vitals, Pain Monitored, asymptomatic 3/10, low DBP  Hx of varied feelings of lightheadedness Yes    Transfer training     HEP 2/14 given with good understanding of form and frequency  - SLS in corner for safety  - side stepping GTB around knees    HEP review with good understanding of form and frequency    3/3 HEP update with moving GTB to mid calf for increased resistance to help improve stability in stance, pt showed good understanding of form and frequency    3/3 review Hs and gastroc stretch for home stephania with good understanding of form and frequency                Orthotic assessment Naboso inserts(L leija) trialed today in B shoes)    Patient Education POC, role of PT, safety, HEP semi tandem and how to progress with HHT and VHT    Pt education on the importance of practicing newly learned gait mechanics outside of therapy as well as block practice on TM as it relates to progression throughout therapy. Pt verbalized understanding and agreed    Neurologist appt yesterday with Dr Murray (R hand and leg EMG). Next week he has an MRI of the brain    Pt edu about more NBOS with ambulation. Edu about outcome measure comparison. Edu about when doing block taps at home to focus on the LE on the floor taking the weight and little to no weight going on the step, edu about skin checks for feet 2/2 naboso inserts - pt receptive to all education                          Yes    THER EX  (CPT Code 21684) TOTAL TIME FOR SESSION 8-22 Minutes Completed   STRETCHING     Stretching  Hamstrings seated with foot on 6\" block, B 1min x 2sets  DF slant board, slant board #2, 2min   Seated figure 4, 1min B  Piriformis B  LTR  Open book Yes       Yes  Yes  Yes    Supine There-Ex Clamshells  Bridges  SLR  S/L hip abd    Seated Ther-Ex     Standing There-Ex Side stepping GTB around knees  - 2 laps in " "//bars    Heel raises from slant board #2    Split squat at 6\" blocks 10x R, 10x L           Yes    Nu Step (time/level/SPM) Seat #12, LE only, 5min    Stationary Bike     Treadmill Focus on narrowing YANA with B UE support 6 min    NEURO RE-ED  (CPT Code 70714) TOTAL TIME FOR SESSION 23-37 Minutes Completed   Balance Training Rockerboard    Airex:  - stepping on and off RLE lead 20x LLE lead 20x   - staggered stance 60sec B  - FA HHT, VHT, + sway occasional //bars needed  - FA EC - modA and //bars needed  - FA, trunk rotations blue theraband  - FA, scapular retraction blue theraband     Split Stance    SLS block practice  - improved to 15 sec CGA    Tandem   - static  - fwd/bkw WS  - walking fwd/bkw    FA, EC, + sway, S       Yes  Yes  Yes  Yes   Yes  Yes                        Yes    Coordination 4 square (PVC piping); CW 8x and CCW 8x    Kianna: single step over, 10x R 10x L    6\" hurdles: forwards , laterally, alt LE lead, repeated     Agility ladder    Intermittent laps in between NMRE to focus on more narrow YANA and use of naboso inserts in    Pre Gait Activities Block tappin\" block 20x without insert then 20x with insert, 10x 6\" block taps B heel only  Kianna taps  - no UE support    Kianna step over   - no UE support    Block taps 8\" forwards, 30x B, no UE support   Block taps 8\" laterally, 30x B, no UE support                  Yes  Yes    Postural Re-Education Back against wall, open books, PPT  Chin tucks  Rows  Palloff press    Outcome Assessments Mendoza   Yes    GAIT TRAINING  (CPT Code 76676) TOTAL TIME FOR SESSION 0-7 Minutes billed with TE Completed   Ambulation No AD  - focus on narrowing YANA  - some scissoring in R LE, improved with cueing    Walking Assessments WBOS and unsteady  R LE scissoring when narrowed YANA  6MWT  Gait speed   Endurance   Yes  Yes    Stair negotiation Reciprocal HR on R when ascending    Curb negotiation CGA R LE WB    Ramp negotiation CGA, felt unsteady in descending  "   Outdoor ambulation     MANUAL  (CPT Code 15791) TOTAL TIME FOR SESSION Not performed Completed   Stretching by therapist/PROM     Mobilization      MODALITIES   TOTAL TIME FOR SESSION Not performed Completed   Ice     Heat     ATTENDED E-STIM  (CPT Code 67520) TOTAL TIME FOR SESSION Not performed Completed   Attended E-Stim

## 2025-03-11 NOTE — PROGRESS NOTES
Physical Therapy Progress Note    PT PROGRESS NOTE FOR OUTPATIENT THERAPY    Patient: Roberth Amato   MRN: [id number]  : 1950 74 y.o.    Referring Physician: Augusto Murray DO  Date of Visit: 3/10/2025    Certification Dates: 25 through 25    Recommended Frequency & Duration:  2 times/week for up to 3 months   1-2x/week    Diagnosis:   1. Unsteadiness on feet    2. Impaired functional mobility, balance, gait, and endurance        Chief Complaints:  No chief complaint on file.      Precautions:   Precautions comments: diabetic neuropathy    TODAY'S VISIT:    Time In Session:      General Information - 03/10/25 09          Session Details    Document Type progress note     Mode of Treatment individual therapy        General Information    Onset of Illness/Injury or Date of Surgery 10/07/24     Referring Physician Dr. Murray     History of present illness/functional impairment Destin is a 73 yo M with a PMH of AAA, DM Type II, stage III kidney disease, CAD, s/p CABGx4 2021, neuropathy.s/p C2-7 PDF 10/7/24 at [facility], with DC on 10/11/24 due to cervical myelopathy. DC home without homecare. Since then he has been going to the gym. Pt continues to have some minor hand tingling complaints and continues to have his baseline diabetic neuropathy in feet. Eager to start PT.     Patient/Family/Caregiver Comments/Observations no new changes. reports he was sick friday     Precautions comments diabetic neuropathy                    Daily Falls Screen - 03/10/25 09          Daily Falls Assessment    Patient reported fall since last visit No                    Pain/Vitals - 03/10/25 09          Pain Assessment    Currently in pain No/Denies        Pre Activity Vital Signs    /40        Post Activity Vital Signs    Post Activity /48                    PT - 03/10/25 0904          Physical Therapy    Physical Therapy Specialty Traditional Neuro PT        PT Plan    Frequency  of treatment 2 times/week     PT Duration 3 months     PT Custom Frequency and Duration 1-2x/week     PT Cert From 02/11/25     PT Cert To 05/12/25     Date PT POC was sent to provider 02/11/25        OP Physical Therapy Plan of Care    Signed PT Plan of Care received?  No                    Assessment and Plan - 03/10/25 0904          Assessment    Clinical Assessment Destin arrives to PT progress note session after attending 6 outpatient PT visits. He has demonstrated increase in Mendoza from 45 to 48/56, he has statistically significantly improved his 6MWT by 208' and his gait speed by 0.11m/s. He bought naboso inserts and wore them today - PT reminded pt the importance of breaks from the inserts and skin checks - pt reports understanding and will check out the plantar surfaces of his feet when he gets home. Pt with lower DBP today however  asymptomatic and also bradycardic however followed by cardiology. Pt continues to make good improvements with balance and has returned to productive 3x/week gym sessions. He is looking forward to visiting his family in [place] this week. Pt met all STGs and will finish POC working on LTGs.     Plan and Recommendations dynamic balance, Floor transfers. ankle strengthening                        OBJECTIVE MEASUREMENTS/DATA:    Outcome Measures    PT Outcome Measures - 03/10/25 0904          Objective Outcome Measures    6 Minute Walk Test 1355     Gait Speed (m/sec) 1.23 m/sec        Mendoza Balance Scale    Sitting to Standing Able to stand without using hands and stabilize independently     Standing Unsupported Able to stand safely for 2 minutes     Sitting with Back Unsupported but Feet Supported on Floor or on a Stool Able to sit safely and securely for 2 minutes     Standing to Sitting Sits safely with minimal use of hands     Transfers Able to transfer safely with minor use of hands     Standing Unsupported with Eyes Closed Able to stand 3 seconds     Standing Unsupported with Feet  Together Able to place feet together independently and stand 1 minute safely     Reach Forward with Outstretched Arm While Standing Can reach forward 12 cm (5 inches)      Object from Floor from a Standing Position Able to  slipper safely and easily     Turning to Look Behind Over Left and Right Shoulders While Standing Turns sideways only but maintains balance     Turn 360 Degrees Able to turn 360 degrees safely in 4 seconds or less     Place Alternate Foot on Step or Stool While Standing Unsupported Able to stand independently and safely and complete 8 steps in 20 seconds     Standing Unsupported One Foot in Front Able to place foot ahead independently and hold 30 seconds     Standing on One Leg Able to lift leg independently and hold greater than or equal to 3 seconds     Mendoza Balance Score 48                     ROM and MMT          2/11/2025   PT LE ROM Measurements   PROM: Right Ankle DF 10 degrees   PROM: Left Ankle DF 10 degrees   PT LE MMT   Right Hip Flexion (4/5) good   Left Hip Flexion (4/5) good   Right Hip Extension (4/5) good   Left Hip Extension (4/5) good   Right Hip ABD (4/5) good   Left Hip ABD (4/5) good   Right Hip ADD (4/5) good   Left Hip ADD (4/5) good   Right Knee Flexion (4/5) good   Left Knee Flexion (4/5) good   Right Knee Extension (4/5) good   Left Knee Extension (4/5) good   Right Ankle DF (4/5) good   Left Ankle DF (4/5) good   Right Ankle PF (4/5) good   Left Ankle PF (4/5) good     Outcome Measures          2/11/2025    13:08 3/10/2025    09:04   PT OBJECTIVE Outcome Measures   6 Minute Walk Test 1147       no AD, no LOB, (I) 1355   Gait Speed (m/sec) 1.12 m/sec 1.23 m/sec   30 Second Sit to Stand 10 repetitions       standard chair, UEs on thighs    Mendoza Balance Score 45 48   PT SUBJECTIVE Outcome Measures   PSFS % Score 53.33 %      Vestibular Outcome Measures          2/11/2025    13:08 3/10/2025    09:04   Vestibular   Gait Speed (m/sec) 1.12 m/sec 1.23 m/sec   6  "Minute Walk Test 1147       no AD, no LOB, (I) 1350        Today's Treatment::    Education provided:  Yes: See treatment log for details of education provided    PT Neuro Exercises Current Session Time Completed   THER ACT   (CPT Code 19054) TOTAL TIME FOR SESSION 8-22 Minutes    Monitored Vitals, Pain Monitored, asymptomatic 3/10, low DBP  Hx of varied feelings of lightheadedness Yes    Transfer training     HEP 2/14 given with good understanding of form and frequency  - SLS in corner for safety  - side stepping GTB around knees    HEP review with good understanding of form and frequency    3/3 HEP update with moving GTB to mid calf for increased resistance to help improve stability in stance, pt showed good understanding of form and frequency    3/3 review Hs and gastroc stretch for home stephania with good understanding of form and frequency                Orthotic assessment Naboso inserts(L leija) trialed today in B shoes)    Patient Education POC, role of PT, safety, HEP semi tandem and how to progress with HHT and VHT    Pt education on the importance of practicing newly learned gait mechanics outside of therapy as well as block practice on TM as it relates to progression throughout therapy. Pt verbalized understanding and agreed    Neurologist appt yesterday with Dr Murray (R hand and leg EMG). Next week he has an MRI of the brain    Pt edu about more NBOS with ambulation. Edu about outcome measure comparison. Edu about when doing block taps at home to focus on the LE on the floor taking the weight and little to no weight going on the step, edu about skin checks for feet 2/2 naboso inserts - pt receptive to all education                          Yes    THER EX  (CPT Code 63326) TOTAL TIME FOR SESSION 8-22 Minutes Completed   STRETCHING     Stretching  Hamstrings seated with foot on 6\" block, B 1min x 2sets  DF slant board, slant board #2, 2min   Seated figure 4, 1min B  Piriformis B  LTR  Open book Yes " "      Yes  Yes  Yes    Supine There-Ex Clamshells  Bridges  SLR  S/L hip abd    Seated Ther-Ex     Standing There-Ex Side stepping GTB around knees  - 2 laps in //bars    Heel raises from slant board #2    Split squat at 6\" blocks 10x R, 10x L           Yes    Nu Step (time/level/SPM) Seat #12, LE only, 5min    Stationary Bike     Treadmill Focus on narrowing YANA with B UE support 6 min    NEURO RE-ED  (CPT Code 36101) TOTAL TIME FOR SESSION 23-37 Minutes Completed   Balance Training Rockerboard    Airex:  - stepping on and off RLE lead 20x LLE lead 20x   - staggered stance 60sec B  - FA HHT, VHT, + sway occasional //bars needed  - FA EC - modA and //bars needed  - FA, trunk rotations blue theraband  - FA, scapular retraction blue theraband     Split Stance    SLS block practice  - improved to 15 sec CGA    Tandem   - static  - fwd/bkw WS  - walking fwd/bkw    FA, EC, + sway, S       Yes  Yes  Yes  Yes   Yes  Yes                        Yes    Coordination 4 square (PVC piping); CW 8x and CCW 8x    Kianna: single step over, 10x R 10x L    6\" hurdles: forwards , laterally, alt LE lead, repeated     Agility ladder    Intermittent laps in between NMRE to focus on more narrow YANA and use of naboso inserts in    Pre Gait Activities Block tappin\" block 20x without insert then 20x with insert, 10x 6\" block taps B heel only  Kianna taps  - no UE support    Kianna step over   - no UE support    Block taps 8\" forwards, 30x B, no UE support   Block taps 8\" laterally, 30x B, no UE support                  Yes  Yes    Postural Re-Education Back against wall, open books, PPT  Chin tucks  Rows  Palloff press    Outcome Assessments Mendoza   Yes    GAIT TRAINING  (CPT Code 94587) TOTAL TIME FOR SESSION 0-7 Minutes billed with TE Completed   Ambulation No AD  - focus on narrowing YANA  - some scissoring in R LE, improved with cueing    Walking Assessments WBOS and unsteady  R LE scissoring when narrowed YANA  6MWT  Gait speed " "  Endurance   Yes  Yes    Stair negotiation Reciprocal HR on R when ascending    Curb negotiation CGA R LE WB    Ramp negotiation CGA, felt unsteady in descending    Outdoor ambulation     MANUAL  (CPT Code 29992) TOTAL TIME FOR SESSION Not performed Completed   Stretching by therapist/PROM     Mobilization      MODALITIES   TOTAL TIME FOR SESSION Not performed Completed   Ice     Heat     ATTENDED E-STIM  (CPT Code 76269) TOTAL TIME FOR SESSION Not performed Completed   Attended E-Stim           Goals Addressed                      This Visit's Progress      PT; Balance (pt-stated)         Short Term Goals Time Frame Result Comment/Progress   Pt will ascend/descend full flight of stairs reciprocally no UE support, Huber 4-6 weeks Not met Huber with use of rail   Assess elevations. 4-6 weeks met    Pt will tolerate at least 10 minutes of seated cardiovascular endurance training with stable vital signs, to improve pt's activity tolerance. 4-6 weeks met    Pt will increase 6MWT by 100'  4-6 weeks  met 208' improvement   Progress gait speed by 0.05 m/sec without decline in safety or quality while using no AD. 4-6 weeks met 0.12m/s improvement    Pt and caregiver will be mod I with HEP 4-6 weeks Met       Long Term Goals Time Frame Result Comment/Progress   Pt will ascend/descend 4 steps without HR, at (I) level  10-12 weeks     Pt will improve by at least 5 points on Mendoza Balance Scale to indicate clinically significant improvement in standing balance and decreased falls risk. 10-12 weeks     Pt will improve 6MWT by at least 150 ft to demonstrate clinically significant improvement in walking balance. 10-12 weeks     Pt will improve by at least 0.1 m/s during 10MWT to indicate clinically significant improvement in walking speed  10-12 weeks     Pt will be mod I with updated HEP 10-12 weeks     Pt will ascend/descend 6\" curb and mod grade ramp, (I) 10-12 weeks     Pt will tolerate at least 15 minutes of seated " "cardiovascular endurance training with stable vital signs, to improve pt's activity tolerance.  10-12 weeks       \"I want to be able to golf in the summer\"                 Blake Alicia, PT                    "

## 2025-03-18 ENCOUNTER — HOSPITAL ENCOUNTER (OUTPATIENT)
Dept: PHYSICAL THERAPY | Facility: REHABILITATION | Age: 75
Setting detail: THERAPIES SERIES
Discharge: HOME | End: 2025-03-18
Attending: PSYCHIATRY & NEUROLOGY
Payer: MEDICARE

## 2025-03-18 DIAGNOSIS — R26.81 UNSTEADINESS ON FEET: Primary | ICD-10-CM

## 2025-03-18 DIAGNOSIS — Z74.09 IMPAIRED FUNCTIONAL MOBILITY, BALANCE, GAIT, AND ENDURANCE: ICD-10-CM

## 2025-03-18 PROCEDURE — 97112 NEUROMUSCULAR REEDUCATION: CPT | Mod: GP

## 2025-03-18 PROCEDURE — 97110 THERAPEUTIC EXERCISES: CPT | Mod: GP

## 2025-03-18 NOTE — PROGRESS NOTES
Physical Therapy Visit    PT DAILY NOTE FOR OUTPATIENT THERAPY    Patient: Roberth Amato MRN: [id number]  : 1950 74 y.o.  Referring Physician: Augusto Murray DO  Date of Visit: 3/18/2025    Certification Dates: 25 through 25     Diagnosis:   1. Unsteadiness on feet    2. Impaired functional mobility, balance, gait, and endurance        Chief Complaints:       Precautions:   Precautions comments: diabetic neuropathy      TODAY'S VISIT    Time In Session:  Start Time: 1600  Stop Time: 1700  Time Calculation (min): 60 min   History/Vitals/Pain/Encounter Info - 25 1600          Injury History/Precautions/Daily Required Info    Document Type progress note     Primary Therapist Blake Alicia, PT     Onset of Illness/Injury or Date of Surgery 10/07/24     Referring Physician Dr. Murray     Precautions comments diabetic neuropathy     History of present illness/functional impairment Destin is a 73 yo M with a PMH of AAA, DM Type II, stage III kidney disease, CAD, s/p CABGx4 2021, neuropathy.s/p C2-7 PDF 10/7/24 at [facility], with DC on 10/11/24 due to cervical myelopathy. DC home without homecare. Since then he has been going to the gym. Pt continues to have some minor hand tingling complaints and continues to have his baseline diabetic neuropathy in feet. Eager to start PT.     Patient/Family/Caregiver Comments/Observations had a great week in [place] with family     Patient reported fall since last visit No        Pain Assessment    Currently in pain No/Denies        Pre Activity Vital Signs    Pulse 67     /55                    Daily Treatment Assessment and Plan - 25 1600          Daily Treatment Assessment and Plan    Daily Outcome Summary Destin arrives to PT session with excellent work ethic and motivation. He was able to improve gait by the time he left with more NBOS and less unsteadiness. He had no LOB with practice golf swings, using dowel, even with  "cues to follow the \"ball\" with his eyes. Pt had most difficulty with hank (12\") tapping from foam pad, with biased SLS position, and difficulty with rockerboard work (small). Cont with skilled OPPT services to max safety, reduce risk of falls, max endurance and functional (I). Pt appropriately fatigued by end of session.     Plan and Recommendations dynamic balance, Floor transfers. ankle strengthening                          OBJECTIVE DATA TAKEN TODAY:    None taken    Today's Treatment:    PT Neuro Exercises Current Session Time Completed   THER ACT   (CPT Code 78645) TOTAL TIME FOR SESSION 0-7 Minutes    Monitored Vitals, Pain Monitored, asymptomatic 3/10, low DBP  Hx of varied feelings of lightheadedness    Transfer training     HEP 2/14 given with good understanding of form and frequency  - SLS in corner for safety  - side stepping GTB around knees    HEP review with good understanding of form and frequency    3/3 HEP update with moving GTB to mid calf for increased resistance to help improve stability in stance, pt showed good understanding of form and frequency    3/3 review Hs and gastroc stretch for home stephania with good understanding of form and frequency                Orthotic assessment Naboso inserts(L leija) trialed today in B shoes)    Patient Education POC, role of PT, safety, HEP semi tandem and how to progress with HHT and VHT    Pt education on the importance of practicing newly learned gait mechanics outside of therapy as well as block practice on TM as it relates to progression throughout therapy. Pt verbalized understanding and agreed    Neurologist appt yesterday with Dr Murray (R hand and leg EMG). Next week he has an MRI of the brain    Pt edu about more NBOS with ambulation. Edu about outcome measure comparison. Edu about when doing block taps at home to focus on the LE on the floor taking the weight and little to no weight going on the step, edu about skin checks for feet 2/2 naboso " "inserts - pt receptive to all education                           THER EX  (CPT Code 42260) TOTAL TIME FOR SESSION 23-37 Minutes Completed   STRETCHING     Stretching  Hamstrings seated with foot on 6\" block, B 1min x 2sets  DF slant board, slant board #2, 2min   Seated figure 4, 1min B  Piriformis B  LTR  Open book   Yes    Supine There-Ex Clamshells  Bridges  SLR  S/L hip abd    Seated Ther-Ex     Standing There-Ex Side stepping GTB around knees  - 2 laps in //bars    Heel raises from slant board #2    Split squat at 6\" blocks 10x R, 10x L       Yes     Yes    Nu Step (time/level/SPM) Seat #12, LE only, evel #5 then reduced to level #4 for 4min, 5min total Yes    Stationary Bike     Treadmill Focus on narrowing YANA with B UE support 6 min    NEURO RE-ED  (CPT Code 60779) TOTAL TIME FOR SESSION 23-37 Minutes Completed   Balance Training Rockerboard: black full tread  - AP rocking  - static standing with HHT and VHT, modA    Airex:  - stepping on and off RLE lead 20x LLE lead 20x   - staggered stance 60sec B  - FA HHT, VHT, + sway occasional //bars needed  - FA EC - modA and //bars needed  - FA, trunk rotations blue theraband  - FA, scapular retraction blue theraband   - hank taps, 12\", occasional UE support and min-modA    Split Stance    SLS block practice  - improved to 15 sec CGA    Tandem   - static  - fwd/bkw WS  - walking fwd/bkw    FA, EC, + sway, S    Gold swings; 3 iron and 9iron with different YANA, using 2# dowel (could not find golf clubs, not in bag)   Yes  Yes      Yes            Yes                              Yes     Coordination 4 square (PVC piping); CW 8x and CCW 8x    Hank: single step over, 10x R 10x L    6\" hurdles: forwards , laterally, alt LE lead, repeated     Agility ladder    Intermittent laps in between NMRE to focus on more narrow YANA and use of naboso inserts in    Pre Gait Activities Block tappin\" block 20x without insert then 20x with insert, 10x 6\" block taps B heel " "only  Kianna taps  - no UE support    Kianna step over   - no UE support    Block taps 6\" forwards, 30x B, no UE support   Block taps 8\" laterally, 30x B, no UE support                  Yes  Yes    Postural Re-Education Back against wall, open books, PPT  Chin tucks  Rows  Palloff press    Outcome Assessments Mendoza      GAIT TRAINING  (CPT Code 24215) TOTAL TIME FOR SESSION 0-7 Minutes  Completed   Ambulation No AD  - focus on narrowing YANA  - some scissoring in R LE, improved with cueing    Walking Assessments WBOS and unsteady  R LE scissoring when narrowed YANA  6MWT  Gait speed    Stair negotiation Reciprocal HR on R when ascending    Curb negotiation CGA R LE WB    Ramp negotiation CGA, felt unsteady in descending    Outdoor ambulation     MANUAL  (CPT Code 81516) TOTAL TIME FOR SESSION Not performed Completed   Stretching by therapist/PROM     Mobilization      MODALITIES   TOTAL TIME FOR SESSION Not performed Completed   Ice     Heat     ATTENDED E-STIM  (CPT Code 95626) TOTAL TIME FOR SESSION Not performed Completed   Attended E-Stim                               "

## 2025-03-18 NOTE — OP PT TREATMENT LOG
"PT Neuro Exercises Current Session Time Completed   THER ACT   (CPT Code 55227) TOTAL TIME FOR SESSION 0-7 Minutes    Monitored Vitals, Pain Monitored, asymptomatic 3/10, low DBP  Hx of varied feelings of lightheadedness    Transfer training     HEP 2/14 given with good understanding of form and frequency  - SLS in corner for safety  - side stepping GTB around knees    HEP review with good understanding of form and frequency    3/3 HEP update with moving GTB to mid calf for increased resistance to help improve stability in stance, pt showed good understanding of form and frequency    3/3 review Hs and gastroc stretch for home stephania with good understanding of form and frequency                Orthotic assessment Naboso inserts(L leija) trialed today in B shoes)    Patient Education POC, role of PT, safety, HEP semi tandem and how to progress with HHT and VHT    Pt education on the importance of practicing newly learned gait mechanics outside of therapy as well as block practice on TM as it relates to progression throughout therapy. Pt verbalized understanding and agreed    Neurologist appt yesterday with Dr Murray (R hand and leg EMG). Next week he has an MRI of the brain    Pt edu about more NBOS with ambulation. Edu about outcome measure comparison. Edu about when doing block taps at home to focus on the LE on the floor taking the weight and little to no weight going on the step, edu about skin checks for feet 2/2 naboso inserts - pt receptive to all education                           THER EX  (CPT Code 85487) TOTAL TIME FOR SESSION 23-37 Minutes Completed   STRETCHING     Stretching  Hamstrings seated with foot on 6\" block, B 1min x 2sets  DF slant board, slant board #2, 2min   Seated figure 4, 1min B  Piriformis B  LTR  Open book   Yes    Supine There-Ex Clamshells  Bridges  SLR  S/L hip abd    Seated Ther-Ex     Standing There-Ex Side stepping GTB around knees  - 2 laps in //bars    Heel raises from slant board " "#2    Split squat at 6\" blocks 10x R, 10x L       Yes     Yes    Nu Step (time/level/SPM) Seat #12, LE only, evel #5 then reduced to level #4 for 4min, 5min total Yes    Stationary Bike     Treadmill Focus on narrowing YANA with B UE support 6 min    NEURO RE-ED  (CPT Code 31801) TOTAL TIME FOR SESSION 23-37 Minutes Completed   Balance Training Rockerboard: black full tread  - AP rocking  - static standing with HHT and VHT, modA    Airex:  - stepping on and off RLE lead 20x LLE lead 20x   - staggered stance 60sec B  - FA HHT, VHT, + sway occasional //bars needed  - FA EC - modA and //bars needed  - FA, trunk rotations blue theraband  - FA, scapular retraction blue theraband   - hank taps, 12\", occasional UE support and min-modA    Split Stance    SLS block practice  - improved to 15 sec CGA    Tandem   - static  - fwd/bkw WS  - walking fwd/bkw    FA, EC, + sway, S    Gold swings; 3 iron and 9iron with different YANA, using 2# dowel (could not find golf clubs, not in bag)   Yes  Yes      Yes            Yes                              Yes     Coordination 4 square (PVC piping); CW 8x and CCW 8x    Hank: single step over, 10x R 10x L    6\" hurdles: forwards , laterally, alt LE lead, repeated     Agility ladder    Intermittent laps in between NMRE to focus on more narrow YANA and use of naboso inserts in    Pre Gait Activities Block tappin\" block 20x without insert then 20x with insert, 10x 6\" block taps B heel only  Hank taps  - no UE support    Hank step over   - no UE support    Block taps 6\" forwards, 30x B, no UE support   Block taps 8\" laterally, 30x B, no UE support                  Yes  Yes    Postural Re-Education Back against wall, open books, PPT  Chin tucks  Rows  Palloff press    Outcome Assessments Mendoza      GAIT TRAINING  (CPT Code 56082) TOTAL TIME FOR SESSION 0-7 Minutes  Completed   Ambulation No AD  - focus on narrowing YANA  - some scissoring in R LE, improved with cueing    Walking " Assessments WBOS and unsteady  R LE scissoring when narrowed YANA  6MWT  Gait speed    Stair negotiation Reciprocal HR on R when ascending    Curb negotiation CGA R LE WB    Ramp negotiation CGA, felt unsteady in descending    Outdoor ambulation     MANUAL  (CPT Code 13304) TOTAL TIME FOR SESSION Not performed Completed   Stretching by therapist/PROM     Mobilization      MODALITIES   TOTAL TIME FOR SESSION Not performed Completed   Ice     Heat     ATTENDED E-STIM  (CPT Code 77953) TOTAL TIME FOR SESSION Not performed Completed   Attended E-Stim

## 2025-03-20 ENCOUNTER — HOSPITAL ENCOUNTER (OUTPATIENT)
Dept: PHYSICAL THERAPY | Facility: REHABILITATION | Age: 75
Setting detail: THERAPIES SERIES
Discharge: HOME | End: 2025-03-20
Attending: PSYCHIATRY & NEUROLOGY
Payer: MEDICARE

## 2025-03-20 DIAGNOSIS — R26.81 UNSTEADINESS ON FEET: Primary | ICD-10-CM

## 2025-03-20 DIAGNOSIS — Z74.09 IMPAIRED FUNCTIONAL MOBILITY, BALANCE, GAIT, AND ENDURANCE: ICD-10-CM

## 2025-03-20 PROCEDURE — 97110 THERAPEUTIC EXERCISES: CPT | Mod: GP

## 2025-03-20 PROCEDURE — 97112 NEUROMUSCULAR REEDUCATION: CPT | Mod: GP

## 2025-03-20 NOTE — PROGRESS NOTES
"Physical Therapy Visit    PT DAILY NOTE FOR OUTPATIENT THERAPY    Patient: Roberth Amato MRN: [id number]  : 1950 74 y.o.  Referring Physician: Augusto Murray DO  Date of Visit: 3/20/2025    Certification Dates: 25 through 25     Diagnosis:   1. Unsteadiness on feet    2. Impaired functional mobility, balance, gait, and endurance        Chief Complaints:       Precautions:   Precautions comments: diabetic neuropathy      TODAY'S VISIT    Time In Session:      History/Vitals/Pain/Encounter Info - 25 1413          Injury History/Precautions/Daily Required Info    Document Type daily treatment     Primary Therapist Blake Alicia, PT     Onset of Illness/Injury or Date of Surgery 10/07/24     Referring Physician Dr. Murray     Precautions comments diabetic neuropathy     History of present illness/functional impairment Destin is a 73 yo M with a PMH of AAA, DM Type II, stage III kidney disease, CAD, s/p CABGx4 2021, neuropathy.s/p C2-7 PDF 10/7/24 at [facility] [place], with DC on 10/11/24 due to cervical myelopathy. DC home without homecare. Since then he has been going to the gym. Pt continues to have some minor hand tingling complaints and continues to have his baseline diabetic neuropathy in feet. Eager to start PT.     Patient/Family/Caregiver Comments/Observations no new issues, reports \"last session was just what i needed\"     Patient reported fall since last visit No        Pain Assessment    Currently in pain No/Denies                    Daily Treatment Assessment and Plan - 25 1413          Daily Treatment Assessment and Plan    Daily Outcome Summary Destin arrives to PT session with excellent work ethic and motivation to participate. Session focused on TE strengtehening on proximal mm and on static and dynamic balance. Initiated backwards step ups where pt needed cues for full extension first before contralateral foot landing on block, and needed UE support. " Initiated tall kneeling and half kneeling and pt needed ant chair to get into and out of position. Cont with skilled OPPT services.     Plan and Recommendations dynamic balance, Floor transfers. ankle strengthening                          OBJECTIVE DATA TAKEN TODAY:    None taken    Today's Treatment:    PT Neuro Exercises Current Session Time Completed   THER ACT   (CPT Code 33830) TOTAL TIME FOR SESSION 0-7 Minutes    Monitored Vitals, Pain Monitored, asymptomatic 3/10, low DBP  Hx of varied feelings of lightheadedness    Transfer training     HEP 2/14 given with good understanding of form and frequency  - SLS in corner for safety  - side stepping GTB around knees    HEP review with good understanding of form and frequency    3/3 HEP update with moving GTB to mid calf for increased resistance to help improve stability in stance, pt showed good understanding of form and frequency    3/3 review Hs and gastroc stretch for home stephania with good understanding of form and frequency                Orthotic assessment Naboso inserts(L leija) trialed today in B shoes)    Patient Education POC, role of PT, safety, HEP semi tandem and how to progress with HHT and VHT    Pt education on the importance of practicing newly learned gait mechanics outside of therapy as well as block practice on TM as it relates to progression throughout therapy. Pt verbalized understanding and agreed    Neurologist appt yesterday with Dr Murray (R hand and leg EMG). Next week he has an MRI of the brain    Pt edu about more NBOS with ambulation. Edu about outcome measure comparison. Edu about when doing block taps at home to focus on the LE on the floor taking the weight and little to no weight going on the step, edu about skin checks for feet 2/2 naboso inserts - pt receptive to all education                           THER EX  (CPT Code 85146) TOTAL TIME FOR SESSION 38-52 Minutes Completed   STRETCHING     Stretching  Hamstrings seated with foot on  "6\" block, B 1min x 2sets  DF slant board, slant board #3, 2min   Seated figure 4, 1min B  Piriformis B  LTR  Open book Yes   Yes    Supine There-Ex Clamshells  Bridges  SLR  S/L hip abd    Seated Ther-Ex     Standing There-Ex Side stepping GTB around knees  - 2 laps in //bars    Heel raises from slant board #2    Split squat at 6\" blocks 10x R, 10x L    Side stepping up and down ramp B directions, with green theraband around tibias    Monster walk up and down ramp with green theraband    10x R backwards block 6\" step ups, 10x L       Yes     Yes     Yes      Yes     Yes    Nu Step (time/level/SPM) Seat #12, LE only, level #4 Yes    Stationary Bike     Treadmill Focus on narrowing YANA with B UE support 6 min    NEURO RE-ED  (CPT Code 03278) TOTAL TIME FOR SESSION 8-22 Minutes Completed   Balance Training Rockerboard: black full tread  - AP rocking  - static standing with HHT and VHT, modA    Airex:  - stepping on and off RLE lead 20x LLE lead 20x   - staggered stance 60sec B  - FA HHT, VHT, + sway occasional //bars needed  - FA EC - modA and //bars needed  - FA, trunk rotations blue theraband  - FA, scapular retraction blue theraband   - hank taps, 12\", occasional UE support and min-modA    Split Stance    Staggered stance on river rocks, practicing getting on and off    FA on river rocks with HHT and VHT     SLS block practice  - improved to 15 sec CGA    Tandem   - static  - fwd/bkw WS  - walking fwd/bkw    FA, EC, + sway, S    Gold swings; 3 iron and 9iron with different YANA, using 2# dowel (could not find golf clubs, not in bag)    Biodex                                   Yes       Yes     Coordination 4 square (PVC piping); CW 8x and CCW 8x    Hank: single step over, 10x R 10x L    6\" hurdles: forwards , laterally, alt LE lead, repeated     Agility ladder    Intermittent laps in between NMRE to focus on more narrow YANA and use of naboso inserts in    Pre Gait Activities Block tappin\" block 20x without " "insert then 20x with insert, 10x 6\" block taps B heel only  Kianna taps  - no UE support    Kianna step over   - no UE support    Block taps 6\" forwards, 30x B, no UE support   Block taps 8\" laterally, 30x B, no UE support                   Postural Re-Education Back against wall, open books, PPT  Chin tucks  Rows  Palloff press    Tall kneel with chair    Half kneel with chair, R, PNF D2 without weight  Half kneel with chair, L, PNF D2 without weight           Yes    Yes   Yes    Outcome Assessments Mendoza      GAIT TRAINING  (CPT Code 71016) TOTAL TIME FOR SESSION 0-7 Minutes  Completed   Ambulation No AD  - focus on narrowing YANA  - some scissoring in R LE, improved with cueing    Walking Assessments WBOS and unsteady  R LE scissoring when narrowed YANA  6MWT  Gait speed    Stair negotiation Reciprocal HR on R when ascending    Curb negotiation CGA R LE WB    Ramp negotiation CGA, felt unsteady in descending    Outdoor ambulation     MANUAL  (CPT Code 19858) TOTAL TIME FOR SESSION Not performed Completed   Stretching by therapist/PROM     Mobilization      MODALITIES   TOTAL TIME FOR SESSION Not performed Completed   Ice     Heat     ATTENDED E-STIM  (CPT Code 97195) TOTAL TIME FOR SESSION Not performed Completed   Attended E-Stim                               "

## 2025-03-20 NOTE — OP PT TREATMENT LOG
"PT Neuro Exercises Current Session Time Completed   THER ACT   (CPT Code 23653) TOTAL TIME FOR SESSION 0-7 Minutes    Monitored Vitals, Pain Monitored, asymptomatic 3/10, low DBP  Hx of varied feelings of lightheadedness    Transfer training     HEP 2/14 given with good understanding of form and frequency  - SLS in corner for safety  - side stepping GTB around knees    HEP review with good understanding of form and frequency    3/3 HEP update with moving GTB to mid calf for increased resistance to help improve stability in stance, pt showed good understanding of form and frequency    3/3 review Hs and gastroc stretch for home stephania with good understanding of form and frequency                Orthotic assessment Naboso inserts(L leija) trialed today in B shoes)    Patient Education POC, role of PT, safety, HEP semi tandem and how to progress with HHT and VHT    Pt education on the importance of practicing newly learned gait mechanics outside of therapy as well as block practice on TM as it relates to progression throughout therapy. Pt verbalized understanding and agreed    Neurologist appt yesterday with Dr Murray (R hand and leg EMG). Next week he has an MRI of the brain    Pt edu about more NBOS with ambulation. Edu about outcome measure comparison. Edu about when doing block taps at home to focus on the LE on the floor taking the weight and little to no weight going on the step, edu about skin checks for feet 2/2 naboso inserts - pt receptive to all education                           THER EX  (CPT Code 32284) TOTAL TIME FOR SESSION 38-52 Minutes Completed   STRETCHING     Stretching  Hamstrings seated with foot on 6\" block, B 1min x 2sets  DF slant board, slant board #3, 2min   Seated figure 4, 1min B  Piriformis B  LTR  Open book Yes   Yes    Supine There-Ex Clamshells  Bridges  SLR  S/L hip abd    Seated Ther-Ex     Standing There-Ex Side stepping GTB around knees  - 2 laps in //bars    Heel raises from slant " "board #2    Split squat at 6\" blocks 10x R, 10x L    Side stepping up and down ramp B directions, with green theraband around tibias    Monster walk up and down ramp with green theraband    10x R backwards block 6\" step ups, 10x L       Yes     Yes     Yes      Yes     Yes    Nu Step (time/level/SPM) Seat #12, LE only, level #4 Yes    Stationary Bike     Treadmill Focus on narrowing YANA with B UE support 6 min    NEURO RE-ED  (CPT Code 90619) TOTAL TIME FOR SESSION 8-22 Minutes Completed   Balance Training Rockerboard: black full tread  - AP rocking  - static standing with HHT and VHT, modA    Airex:  - stepping on and off RLE lead 20x LLE lead 20x   - staggered stance 60sec B  - FA HHT, VHT, + sway occasional //bars needed  - FA EC - modA and //bars needed  - FA, trunk rotations blue theraband  - FA, scapular retraction blue theraband   - hank taps, 12\", occasional UE support and min-modA    Split Stance    Staggered stance on river rocks, practicing getting on and off    FA on river rocks with HHT and VHT     SLS block practice  - improved to 15 sec CGA    Tandem   - static  - fwd/bkw WS  - walking fwd/bkw    FA, EC, + sway, S    Gold swings; 3 iron and 9iron with different YANA, using 2# dowel (could not find golf clubs, not in bag)    Biodex                                   Yes       Yes     Coordination 4 square (PVC piping); CW 8x and CCW 8x    Hank: single step over, 10x R 10x L    6\" hurdles: forwards , laterally, alt LE lead, repeated     Agility ladder    Intermittent laps in between NMRE to focus on more narrow YANA and use of naboso inserts in    Pre Gait Activities Block tappin\" block 20x without insert then 20x with insert, 10x 6\" block taps B heel only  Hank taps  - no UE support    Hank step over   - no UE support    Block taps 6\" forwards, 30x B, no UE support   Block taps 8\" laterally, 30x B, no UE support                   Postural Re-Education Back against wall, open books, PPT  Chin " tucks  Rows  Palloff press    Tall kneel with chair    Half kneel with chair, R, PNF D2 without weight  Half kneel with chair, L, PNF D2 without weight           Yes    Yes   Yes    Outcome Assessments Mendoza      GAIT TRAINING  (CPT Code 17218) TOTAL TIME FOR SESSION 0-7 Minutes  Completed   Ambulation No AD  - focus on narrowing YANA  - some scissoring in R LE, improved with cueing    Walking Assessments WBOS and unsteady  R LE scissoring when narrowed YANA  6MWT  Gait speed    Stair negotiation Reciprocal HR on R when ascending    Curb negotiation CGA R LE WB    Ramp negotiation CGA, felt unsteady in descending    Outdoor ambulation     MANUAL  (CPT Code 14180) TOTAL TIME FOR SESSION Not performed Completed   Stretching by therapist/PROM     Mobilization      MODALITIES   TOTAL TIME FOR SESSION Not performed Completed   Ice     Heat     ATTENDED E-STIM  (CPT Code 60341) TOTAL TIME FOR SESSION Not performed Completed   Attended E-Stim

## 2025-03-24 ENCOUNTER — HOSPITAL ENCOUNTER (OUTPATIENT)
Dept: PHYSICAL THERAPY | Facility: REHABILITATION | Age: 75
Setting detail: THERAPIES SERIES
Discharge: HOME | End: 2025-03-24
Attending: PSYCHIATRY & NEUROLOGY
Payer: MEDICARE

## 2025-03-24 DIAGNOSIS — Z74.09 IMPAIRED FUNCTIONAL MOBILITY, BALANCE, GAIT, AND ENDURANCE: ICD-10-CM

## 2025-03-24 DIAGNOSIS — R26.81 UNSTEADINESS ON FEET: Primary | ICD-10-CM

## 2025-03-24 PROCEDURE — 97110 THERAPEUTIC EXERCISES: CPT | Mod: GP

## 2025-03-24 PROCEDURE — 97112 NEUROMUSCULAR REEDUCATION: CPT | Mod: GP

## 2025-03-24 NOTE — OP PT TREATMENT LOG
"PT Neuro Exercises Current Session Time Completed   THER ACT   (CPT Code 02416) TOTAL TIME FOR SESSION Not performed    Monitored Vitals, Pain Monitored, asymptomatic 3/10, low DBP  Hx of varied feelings of lightheadedness    Transfer training     HEP 2/14 given with good understanding of form and frequency  - SLS in corner for safety  - side stepping GTB around knees    HEP review with good understanding of form and frequency    3/3 HEP update with moving GTB to mid calf for increased resistance to help improve stability in stance, pt showed good understanding of form and frequency    3/3 review Hs and gastroc stretch for home stephania with good understanding of form and frequency                Orthotic assessment Naboso inserts(L leija) trialed today in B shoes)    Patient Education POC, role of PT, safety, HEP semi tandem and how to progress with HHT and VHT    Pt education on the importance of practicing newly learned gait mechanics outside of therapy as well as block practice on TM as it relates to progression throughout therapy. Pt verbalized understanding and agreed    Neurologist appt yesterday with Dr Murray (R hand and leg EMG). Next week he has an MRI of the brain    Pt edu about more NBOS with ambulation. Edu about outcome measure comparison. Edu about when doing block taps at home to focus on the LE on the floor taking the weight and little to no weight going on the step, edu about skin checks for feet 2/2 naboso inserts - pt receptive to all education                           THER EX  (CPT Code 49596) TOTAL TIME FOR SESSION 23-37 Minutes Completed   STRETCHING     Stretching  Hamstrings seated with foot on 6\" block, B 1min x 2sets  DF slant board, slant board #3, 2min   Seated figure 4, 1min B  Piriformis B  LTR   Windshield wipers   Open book   Standing open book stretch    YES      YES  YES   YES  YES   Supine There-Ex Clamshells  Bridges  SLR  S/L hip abd    Seated Ther-Ex     Standing There-Ex Side " "stepping GTB around knees  - 2 laps in //bars    Heel raises from slant board #2    Split squat at 6\" blocks 10x R, 10x L    Side stepping up and down ramp B directions, with green theraband around tibias    Monster walk up and down ramp with green theraband    10x R backwards block 6\" step ups, 10x L        Nu Step (time/level/SPM) Seat #12, LE only, level #4 x6mins YES   Stationary Bike     Treadmill Focus on narrowing YANA with B UE support 6 min    NEURO RE-ED  (CPT Code 91698) TOTAL TIME FOR SESSION 23-37 Minutes Completed   Balance Training Rockerboard: black full tread  - AP rocking  - static standing with HHT and VHT, modA    Airex:  - stepping on and off RLE lead 20x LLE lead 20x   - staggered stance 60sec B  - FA HHT, VHT, + sway occasional //bars needed  - FA EC - modA and //bars needed  - FA, trunk rotations blue theraband  - FA, scapular retraction blue theraband   - hank taps, 12\", occasional UE support and min-modA    Split Stance    Staggered stance on river rocks, practicing getting on and off    FA on river rocks with HHT and VHT     SLS block practice  - improved to 15 sec CGA    Tandem   - static  - fwd/bkw WS  - walking fwd/bkw    FA, EC, + sway, S    Gold swings; 3 iron and 9iron with different YANA, using 2# dowel (could not find golf clubs, not in bag)    Biodex     In // bars:   - tandem walking x4 laps without UE support   - staggered stance, holding ball, rotations 2x10 b/l   -  staggered stance, holding ball with rotations from foam 2x10 B/L   - staggered stance resisted trunk rotation with OTB 2x12 B/L   - staggered stance chop and lift 2x10                                                                    YES to all below    Coordination 4 square (PVC piping); CW 8x and CCW 8x    Hank: single step over, 10x R 10x L    6\" hurdles: forwards , laterally, alt LE lead, repeated     Agility ladder    Intermittent laps in between NMRE to focus on more narrow YANA and use of naboso inserts in " "   Pre Gait Activities Block tappin\" block 20x without insert then 20x with insert, 10x 6\" block taps B heel only  Kianna taps  - no UE support    Kianna step over   - no UE support    Block taps 6\" forwards, 30x B, no UE support   Block taps 8\" laterally, 30x B, no UE support                   Postural Re-Education Back against wall, open books, PPT  Chin tucks  Rows  Palloff press    Tall kneel with chair    Half kneel with chair, R, PNF D2 without weight  Half kneel with chair, L, PNF D2 without weight            Outcome Assessments Mendoza      GAIT TRAINING  (CPT Code 20492) TOTAL TIME FOR SESSION Not performed  Completed   Ambulation No AD  - focus on narrowing YANA  - some scissoring in R LE, improved with cueing    Walking Assessments WBOS and unsteady  R LE scissoring when narrowed YANA  6MWT  Gait speed    Stair negotiation Reciprocal HR on R when ascending    Curb negotiation CGA R LE WB    Ramp negotiation CGA, felt unsteady in descending    Outdoor ambulation     MANUAL  (CPT Code 63499) TOTAL TIME FOR SESSION Not performed Completed   Stretching by therapist/PROM     Mobilization      MODALITIES   TOTAL TIME FOR SESSION Not performed Completed   Ice     Heat     ATTENDED E-STIM  (CPT Code 77699) TOTAL TIME FOR SESSION Not performed Completed   Attended E-Stim        "

## 2025-03-24 NOTE — PROGRESS NOTES
Physical Therapy Visit    PT DAILY NOTE FOR OUTPATIENT THERAPY    Patient: oRberth Amato MRN: [id number]  : 1950 74 y.o.  Referring Physician: Augusto Murray DO  Date of Visit: 3/24/2025    Certification Dates: 25 through 25     Diagnosis:   1. Unsteadiness on feet    2. Impaired functional mobility, balance, gait, and endurance        Chief Complaints:       Precautions:   Precautions comments: diabetic neuropathy      TODAY'S VISIT    Time In Session:      History/Vitals/Pain/Encounter Info - 25 1107          Injury History/Precautions/Daily Required Info    Document Type daily treatment     Primary Therapist Blake Alicia, PT     Onset of Illness/Injury or Date of Surgery 10/07/24     Referring Physician Dr. Murray     Precautions comments diabetic neuropathy     History of present illness/functional impairment Destin is a 73 yo M with a PMH of AAA, DM Type II, stage III kidney disease, CAD, s/p CABGx4 2021, neuropathy.s/p C2-7 PDF 10/7/24 at [facility], with DC on 10/11/24 due to cervical myelopathy. DC home without homecare. Since then he has been going to the gym. Pt continues to have some minor hand tingling complaints and continues to have his baseline diabetic neuropathy in feet. Eager to start PT.     Patient/Family/Caregiver Comments/Observations Patient reported that he has been feeling well since last being seen with no new reports of pain     Patient reported fall since last visit No        Pain Assessment    Currently in pain No/Denies        Pre Activity Vital Signs    /67     BP Location Left upper arm     BP Method Automatic     Patient Position Sitting        Activity Vital Signs    Activity Pulse 55     Activity /65     Activity BP Location Left upper arm     Activity BP Method Automatic     Patient Position Sitting                    Daily Treatment Assessment and Plan - 25 1107          Daily Treatment Assessment and Plan     "Progress toward goals Progressing     Daily Outcome Summary Patient tolerated all interventions performed during today's treatment session and reported improved mobility through thoracic spine following SL and standing open book stretch. The patient continues to present wtih decreased stability with tandem/stanggered stance activites and states that \"it is more difficulty to stand and golf especially when I turn my head to watch where my ball went.\" The patient would benefit from continued skilled PT services in order to address all impairments and to maximize function.     Plan and Recommendations dynamic balance, Floor transfers. ankle strengthening                          OBJECTIVE DATA TAKEN TODAY:    None taken    Today's Treatment:    PT Neuro Exercises Current Session Time Completed   THER ACT   (CPT Code 65890) TOTAL TIME FOR SESSION Not performed    Monitored Vitals, Pain Monitored, asymptomatic 3/10, low DBP  Hx of varied feelings of lightheadedness    Transfer training     HEP 2/14 given with good understanding of form and frequency  - SLS in corner for safety  - side stepping GTB around knees    HEP review with good understanding of form and frequency    3/3 HEP update with moving GTB to mid calf for increased resistance to help improve stability in stance, pt showed good understanding of form and frequency    3/3 review Hs and gastroc stretch for home stephania with good understanding of form and frequency                Orthotic assessment Naboso inserts(L leija) trialed today in B shoes)    Patient Education POC, role of PT, safety, HEP semi tandem and how to progress with HHT and VHT    Pt education on the importance of practicing newly learned gait mechanics outside of therapy as well as block practice on TM as it relates to progression throughout therapy. Pt verbalized understanding and agreed    Neurologist appt yesterday with Dr Murray (R hand and leg EMG). Next week he has an MRI of the brain    Pt " "edu about more NBOS with ambulation. Edu about outcome measure comparison. Edu about when doing block taps at home to focus on the LE on the floor taking the weight and little to no weight going on the step, edu about skin checks for feet 2/2 naboso inserts - pt receptive to all education                           THER EX  (CPT Code 35949) TOTAL TIME FOR SESSION 23-37 Minutes Completed   STRETCHING     Stretching  Hamstrings seated with foot on 6\" block, B 1min x 2sets  DF slant board, slant board #3, 2min   Seated figure 4, 1min B  Piriformis B  LTR   Windshield wipers   Open book   Standing open book stretch    YES      YES  YES   YES  YES   Supine There-Ex Clamshells  Bridges  SLR  S/L hip abd    Seated Ther-Ex     Standing There-Ex Side stepping GTB around knees  - 2 laps in //bars    Heel raises from slant board #2    Split squat at 6\" blocks 10x R, 10x L    Side stepping up and down ramp B directions, with green theraband around tibias    Monster walk up and down ramp with green theraband    10x R backwards block 6\" step ups, 10x L        Nu Step (time/level/SPM) Seat #12, LE only, level #4 x6mins YES   Stationary Bike     Treadmill Focus on narrowing YANA with B UE support 6 min    NEURO RE-ED  (CPT Code 30489) TOTAL TIME FOR SESSION 23-37 Minutes Completed   Balance Training Rockerboard: black full tread  - AP rocking  - static standing with HHT and VHT, modA    Airex:  - stepping on and off RLE lead 20x LLE lead 20x   - staggered stance 60sec B  - FA HHT, VHT, + sway occasional //bars needed  - FA EC - modA and //bars needed  - FA, trunk rotations blue theraband  - FA, scapular retraction blue theraband   - hank taps, 12\", occasional UE support and min-modA    Split Stance    Staggered stance on river rocks, practicing getting on and off    FA on river rocks with HHT and VHT     SLS block practice  - improved to 15 sec CGA    Tandem   - static  - fwd/bkw WS  - walking fwd/bkw    FA, EC, + sway, S    Gold " "swings; 3 iron and 9iron with different YANA, using 2# dowel (could not find golf clubs, not in bag)    Biodex     In // bars:   - tandem walking x4 laps without UE support   - staggered stance, holding ball, rotations 2x10 b/l   -  staggered stance, holding ball with rotations from foam 2x10 B/L   - staggered stance resisted trunk rotation with OTB 2x12 B/L   - staggered stance chop and lift 2x10                                                                    YES to all below    Coordination 4 square (PVC piping); CW 8x and CCW 8x    Kianna: single step over, 10x R 10x L    6\" hurdles: forwards , laterally, alt LE lead, repeated     Agility ladder    Intermittent laps in between NMRE to focus on more narrow YANA and use of naboso inserts in    Pre Gait Activities Block tappin\" block 20x without insert then 20x with insert, 10x 6\" block taps B heel only  Kianna taps  - no UE support    Kianna step over   - no UE support    Block taps 6\" forwards, 30x B, no UE support   Block taps 8\" laterally, 30x B, no UE support                   Postural Re-Education Back against wall, open books, PPT  Chin tucks  Rows  Palloff press    Tall kneel with chair    Half kneel with chair, R, PNF D2 without weight  Half kneel with chair, L, PNF D2 without weight            Outcome Assessments Mendoza      GAIT TRAINING  (CPT Code 36621) TOTAL TIME FOR SESSION Not performed  Completed   Ambulation No AD  - focus on narrowing YANA  - some scissoring in R LE, improved with cueing    Walking Assessments WBOS and unsteady  R LE scissoring when narrowed YANA  6MWT  Gait speed    Stair negotiation Reciprocal HR on R when ascending    Curb negotiation CGA R LE WB    Ramp negotiation CGA, felt unsteady in descending    Outdoor ambulation     MANUAL  (CPT Code 43993) TOTAL TIME FOR SESSION Not performed Completed   Stretching by therapist/PROM     Mobilization      MODALITIES   TOTAL TIME FOR SESSION Not performed Completed   Ice     Heat   "   ATTENDED E-STIM  (CPT Code 07050) TOTAL TIME FOR SESSION Not performed Completed   Attended E-Stim

## 2025-03-27 ENCOUNTER — HOSPITAL ENCOUNTER (OUTPATIENT)
Dept: PHYSICAL THERAPY | Facility: REHABILITATION | Age: 75
Setting detail: THERAPIES SERIES
Discharge: HOME | End: 2025-03-27
Attending: PSYCHIATRY & NEUROLOGY
Payer: MEDICARE

## 2025-03-27 DIAGNOSIS — R26.81 UNSTEADINESS ON FEET: Primary | ICD-10-CM

## 2025-03-27 DIAGNOSIS — Z74.09 IMPAIRED FUNCTIONAL MOBILITY, BALANCE, GAIT, AND ENDURANCE: ICD-10-CM

## 2025-03-27 PROCEDURE — 97110 THERAPEUTIC EXERCISES: CPT | Mod: GP

## 2025-03-27 PROCEDURE — 97112 NEUROMUSCULAR REEDUCATION: CPT | Mod: GP

## 2025-03-27 NOTE — PROGRESS NOTES
Physical Therapy Visit    PT DAILY NOTE FOR OUTPATIENT THERAPY    Patient: Roberth Amato MRN: [id number]  : 1950 74 y.o.  Referring Physician: Augusto Murray DO  Date of Visit: 3/27/2025    Certification Dates: 25 through 25     Diagnosis:   1. Unsteadiness on feet    2. Impaired functional mobility, balance, gait, and endurance        Chief Complaints:       Precautions:   Precautions comments: diabetic neuropathy      TODAY'S VISIT    Time In Session:      History/Vitals/Pain/Encounter Info - 25 1405          Injury History/Precautions/Daily Required Info    Document Type daily treatment     Primary Therapist Blake Alicia, PT     Onset of Illness/Injury or Date of Surgery 10/07/24     Referring Physician Dr. Murray     Precautions comments diabetic neuropathy     History of present illness/functional impairment Destin is a 75 yo M with a PMH of AAA, DM Type II, stage III kidney disease, CAD, s/p CABGx4 2021, neuropathy.s/p C2-7 PDF 10/7/24 at [facility], with DC on 10/11/24 due to cervical myelopathy. DC home without homecare. Since then he has been going to the gym. Pt continues to have some minor hand tingling complaints and continues to have his baseline diabetic neuropathy in feet. Eager to start PT.     Patient/Family/Caregiver Comments/Observations reports no new issues     Patient reported fall since last visit No        Pain Assessment    Currently in pain No/Denies        Pre Activity Vital Signs    /40        Post Activity Vital Signs    Post Activity Pulse 78     Post Activity /40                    Daily Treatment Assessment and Plan - 25 1405          Daily Treatment Assessment and Plan    Progress toward goals Progressing     Daily Outcome Summary Destin presents to PT session with great motivation and participation. Session focused on static and dynamic balance work on dynamic and static surfaces. He had most difficulty with airex  "work and tapping hank (12\") and occasionally when stepping up on it he hit it with toes, and difficulty with tandem stance on foam - unable to maintain 30sec at a time before requiring UE support at wall. Pt with low DBP to 40 and pt reports he will call his NP cardiologist in the car or when he gets home. Denies wanting PT to call. Will follow up as needed. Cont with skilled OPPT services to reduce risk of falls and maximize efficiency of gait and functional (I).     Plan and Recommendations golf mechanics, mat stretching. Floor transfers. ankle strengthening                          OBJECTIVE DATA TAKEN TODAY:    None taken    Today's Treatment:    PT Neuro Exercises Current Session Time Completed   THER ACT   (CPT Code 18378) TOTAL TIME FOR SESSION Not performed    Monitored Vitals, Pain Monitored, asymptomatic but low DBP, edu pt to contact his NP/cardiologist today  Hx of varied feelings of lightheadedness Yes    Transfer training     HEP 2/14 given with good understanding of form and frequency  - SLS in corner for safety  - side stepping GTB around knees    HEP review with good understanding of form and frequency    3/3 HEP update with moving GTB to mid calf for increased resistance to help improve stability in stance, pt showed good understanding of form and frequency    3/3 review Hs and gastroc stretch for home stephania with good understanding of form and frequency                Orthotic assessment Naboso inserts(L leija) trialed today in B shoes)    Patient Education POC, role of PT, safety, HEP semi tandem and how to progress with HHT and VHT    Pt education on the importance of practicing newly learned gait mechanics outside of therapy as well as block practice on TM as it relates to progression throughout therapy. Pt verbalized understanding and agreed    Neurologist appt yesterday with Dr Murray (R hand and leg EMG). Next week he has an MRI of the brain    Pt edu about more NBOS with ambulation. Edu " "about outcome measure comparison. Edu about when doing block taps at home to focus on the LE on the floor taking the weight and little to no weight going on the step, edu about skin checks for feet 2/2 naboso inserts - pt receptive to all education                           THER EX  (CPT Code 81618) TOTAL TIME FOR SESSION 8-22 Minutes Completed   STRETCHING     Stretching  Hamstrings seated with foot on 6\" block, B 1min x 2sets  DF slant board, slant board #3, 2min   Seated figure 4, 1min B  Piriformis B  LTR   Windshield wipers   Open book   Standing open book stretch    YES       Supine There-Ex Clamshells  Bridges  SLR  S/L hip abd    Seated Ther-Ex     Standing There-Ex Side stepping GTB around knees  - 2 laps in //bars    Heel raises from slant board #2    Split squat at 6\" blocks 10x R, 10x L    Side stepping up and down ramp B directions, with green theraband around tibias    Monster walk up and down ramp with green theraband    10x R backwards block 6\" step ups, 10x L        Elliptical 2min, hands on stationary bar the whole time, level 1.7 Yes    Nu Step (time/level/SPM) Seat #12, LE only, level #4 x6mins    Stationary Bike Level #5, seat 14, 5min Yes    Treadmill Focus on narrowing YANA with B UE support 6 min    NEURO RE-ED  (CPT Code 62333) TOTAL TIME FOR SESSION 38-52 Minutes Completed   Balance Training Rockerboard: black full tread  - AP rocking  - static standing with HHT and VHT, modA    Airex:  - stepping on and off RLE lead 20x LLE lead 20x   - FA HHT, VHT, + sway occasional //bars needed  - FA EC - modA and //bars needed  - FA, trunk rotations blue theraband  - FA, scapular retraction pink theraband   - hank taps, 12\", occasional UE support and Huber  - semi tandem  - tandem  - FA, 4# dowel ball toss push backs  - feet closer together, golf swings with 2# dowel    Split Stance    Staggered stance on river rocks, practicing getting on and off    FA on river rocks with HHT and VHT     SLS block " "practice  - improved to 15 sec CGA    Tandem   - static  - fwd/bkw WS  - walking fwd/bkw    FA, EC, + sway, S    Gold swings; 3 iron and 9iron with different YANA, using 2# dowel (could not find golf clubs, not in bag)    Biodex     In // bars:   - tandem walking x4 laps without UE support   - staggered stance, holding ball, rotations 2x10 b/l   -  staggered stance, holding ball with rotations from foam 2x10 B/L   - staggered stance resisted trunk rotation with OTB 2x12 B/L   - staggered stance chop and lift 2x10              Yes         Yes   Yes   Yes  Yes   Yes   Yes    Coordination 4 square (PVC piping); CW 8x and CCW 8x    Kianna: single step over, 10x R 10x L    6\" hurdles: forwards , laterally, alt LE lead, repeated     Agility ladder    Intermittent laps in between NMRE to focus on more narrow YANA and use of naboso inserts in    Heel walking holding 6# dowel overhead    Toe walking holding 6# dowel overhead    Backwards walking holding 6# dowel overhead    Forward and backwards 4# dowel ball toss taps                       Yes    Yes    Yes     Yes    Pre Gait Activities Block tappin\" block 20x without insert then 20x with insert, 10x 6\" block taps B heel only  Kianna taps  - no UE support    Kianna step over   - no UE support    Block taps 6\" forwards, 30x B, no UE support   Block taps 8\" laterally, 30x B, no UE support                   Postural Re-Education Back against wall, open books, PPT  Chin tucks  Rows  Palloff press    Tall kneel with chair    Half kneel with chair, R, PNF D2 without weight  Half kneel with chair, L, PNF D2 without weight            Outcome Assessments Mendoza      GAIT TRAINING  (CPT Code 67269) TOTAL TIME FOR SESSION Not performed  Completed   Ambulation No AD  - focus on narrowing YANA  - some scissoring in R LE, improved with cueing    Walking Assessments WBOS and unsteady  R LE scissoring when narrowed YANA  6MWT  Gait speed    Stair negotiation Reciprocal HR on R when " ascending    Curb negotiation CGA R LE WB    Ramp negotiation CGA, felt unsteady in descending    Outdoor ambulation     MANUAL  (CPT Code 32753) TOTAL TIME FOR SESSION Not performed Completed   Stretching by therapist/PROM     Mobilization      MODALITIES   TOTAL TIME FOR SESSION Not performed Completed   Ice     Heat     ATTENDED E-STIM  (CPT Code 97824) TOTAL TIME FOR SESSION Not performed Completed   Attended E-Stim

## 2025-03-27 NOTE — OP PT TREATMENT LOG
"PT Neuro Exercises Current Session Time Completed   THER ACT   (CPT Code 90197) TOTAL TIME FOR SESSION Not performed    Monitored Vitals, Pain Monitored, asymptomatic but low DBP, edu pt to contact his NP/cardiologist today  Hx of varied feelings of lightheadedness Yes    Transfer training     HEP 2/14 given with good understanding of form and frequency  - SLS in corner for safety  - side stepping GTB around knees    HEP review with good understanding of form and frequency    3/3 HEP update with moving GTB to mid calf for increased resistance to help improve stability in stance, pt showed good understanding of form and frequency    3/3 review Hs and gastroc stretch for home stephania with good understanding of form and frequency                Orthotic assessment Naboso inserts(L leija) trialed today in B shoes)    Patient Education POC, role of PT, safety, HEP semi tandem and how to progress with HHT and VHT    Pt education on the importance of practicing newly learned gait mechanics outside of therapy as well as block practice on TM as it relates to progression throughout therapy. Pt verbalized understanding and agreed    Neurologist appt yesterday with Dr Murray (R hand and leg EMG). Next week he has an MRI of the brain    Pt edu about more NBOS with ambulation. Edu about outcome measure comparison. Edu about when doing block taps at home to focus on the LE on the floor taking the weight and little to no weight going on the step, edu about skin checks for feet 2/2 naboso inserts - pt receptive to all education                           THER EX  (CPT Code 19392) TOTAL TIME FOR SESSION 8-22 Minutes Completed   STRETCHING     Stretching  Hamstrings seated with foot on 6\" block, B 1min x 2sets  DF slant board, slant board #3, 2min   Seated figure 4, 1min B  Piriformis B  LTR   Windshield wipers   Open book   Standing open book stretch    YES       Supine There-Ex Clamshells  Bridges  SLR  S/L hip abd    Seated Ther-Ex   " "  Standing There-Ex Side stepping GTB around knees  - 2 laps in //bars    Heel raises from slant board #2    Split squat at 6\" blocks 10x R, 10x L    Side stepping up and down ramp B directions, with green theraband around tibias    Monster walk up and down ramp with green theraband    10x R backwards block 6\" step ups, 10x L        Elliptical 2min, hands on stationary bar the whole time, level 1.7 Yes    Nu Step (time/level/SPM) Seat #12, LE only, level #4 x6mins    Stationary Bike Level #5, seat 14, 5min Yes    Treadmill Focus on narrowing YANA with B UE support 6 min    NEURO RE-ED  (CPT Code 81161) TOTAL TIME FOR SESSION 38-52 Minutes Completed   Balance Training Rockerboard: black full tread  - AP rocking  - static standing with HHT and VHT, modA    Airex:  - stepping on and off RLE lead 20x LLE lead 20x   - FA HHT, VHT, + sway occasional //bars needed  - FA EC - modA and //bars needed  - FA, trunk rotations blue theraband  - FA, scapular retraction pink theraband   - hank taps, 12\", occasional UE support and Huber  - semi tandem  - tandem  - FA, 4# dowel ball toss push backs  - feet closer together, golf swings with 2# dowel    Split Stance    Staggered stance on river rocks, practicing getting on and off    FA on river rocks with HHT and VHT     SLS block practice  - improved to 15 sec CGA    Tandem   - static  - fwd/bkw WS  - walking fwd/bkw    FA, EC, + sway, S    Gold swings; 3 iron and 9iron with different YANA, using 2# dowel (could not find golf clubs, not in bag)    Biodex     In // bars:   - tandem walking x4 laps without UE support   - staggered stance, holding ball, rotations 2x10 b/l   -  staggered stance, holding ball with rotations from foam 2x10 B/L   - staggered stance resisted trunk rotation with OTB 2x12 B/L   - staggered stance chop and lift 2x10              Yes         Yes   Yes   Yes  Yes   Yes   Yes    Coordination 4 square (PVC piping); CW 8x and CCW 8x    Hank: single step over, 10x R " "10x L    6\" hurdles: forwards , laterally, alt LE lead, repeated     Agility ladder    Intermittent laps in between NMRE to focus on more narrow YANA and use of naboso inserts in    Heel walking holding 6# dowel overhead    Toe walking holding 6# dowel overhead    Backwards walking holding 6# dowel overhead    Forward and backwards 4# dowel ball toss taps                       Yes    Yes    Yes     Yes    Pre Gait Activities Block tappin\" block 20x without insert then 20x with insert, 10x 6\" block taps B heel only  Kianna taps  - no UE support    Kianna step over   - no UE support    Block taps 6\" forwards, 30x B, no UE support   Block taps 8\" laterally, 30x B, no UE support                   Postural Re-Education Back against wall, open books, PPT  Chin tucks  Rows  Palloff press    Tall kneel with chair    Half kneel with chair, R, PNF D2 without weight  Half kneel with chair, L, PNF D2 without weight            Outcome Assessments Mendoza      GAIT TRAINING  (CPT Code 42584) TOTAL TIME FOR SESSION Not performed  Completed   Ambulation No AD  - focus on narrowing YANA  - some scissoring in R LE, improved with cueing    Walking Assessments WBOS and unsteady  R LE scissoring when narrowed YANA  6MWT  Gait speed    Stair negotiation Reciprocal HR on R when ascending    Curb negotiation CGA R LE WB    Ramp negotiation CGA, felt unsteady in descending    Outdoor ambulation     MANUAL  (CPT Code 95678) TOTAL TIME FOR SESSION Not performed Completed   Stretching by therapist/PROM     Mobilization      MODALITIES   TOTAL TIME FOR SESSION Not performed Completed   Ice     Heat     ATTENDED E-STIM  (CPT Code 29202) TOTAL TIME FOR SESSION Not performed Completed   Attended E-Stim        "

## 2025-03-31 ENCOUNTER — HOSPITAL ENCOUNTER (OUTPATIENT)
Dept: PHYSICAL THERAPY | Facility: REHABILITATION | Age: 75
Setting detail: THERAPIES SERIES
Discharge: HOME | End: 2025-03-31
Attending: PSYCHIATRY & NEUROLOGY
Payer: MEDICARE

## 2025-03-31 DIAGNOSIS — R26.81 UNSTEADINESS ON FEET: Primary | ICD-10-CM

## 2025-03-31 DIAGNOSIS — Z74.09 IMPAIRED FUNCTIONAL MOBILITY, BALANCE, GAIT, AND ENDURANCE: ICD-10-CM

## 2025-03-31 PROCEDURE — 97530 THERAPEUTIC ACTIVITIES: CPT | Mod: GP

## 2025-03-31 PROCEDURE — 97110 THERAPEUTIC EXERCISES: CPT | Mod: GP

## 2025-03-31 NOTE — OP PT TREATMENT LOG
"PT Neuro Exercises Current Session Time Completed   THER ACT   (CPT Code 58307) TOTAL TIME FOR SESSION 23-37 Minutes    Monitored Vitals, Pain Monitored, asymptomatic but low DBP, checked frequently, rest breaks, increased hydration  Hx of varied feelings of lightheadedness Yes    Transfer training     HEP 2/14 given with good understanding of form and frequency  - SLS in corner for safety  - side stepping GTB around knees    HEP review with good understanding of form and frequency    3/3 HEP update with moving GTB to mid calf for increased resistance to help improve stability in stance, pt showed good understanding of form and frequency    3/3 review Hs and gastroc stretch for home stephania with good understanding of form and frequency                Orthotic assessment Naboso inserts(L leija) trialed today in B shoes)    Patient Education POC, role of PT, safety, HEP semi tandem and how to progress with HHT and VHT    Pt education on the importance of practicing newly learned gait mechanics outside of therapy as well as block practice on TM as it relates to progression throughout therapy. Pt verbalized understanding and agreed    Neurologist appt yesterday with Dr Murray (R hand and leg EMG). Next week he has an MRI of the brain    Pt edu about more NBOS with ambulation. Edu about outcome measure comparison. Edu about when doing block taps at home to focus on the LE on the floor taking the weight and little to no weight going on the step, edu about skin checks for feet 2/2 naboso inserts - pt receptive to all education     Need for f/u with cardiologist, need for parameters for pt to return so as not to waste his time or have to send pt home next session. PT called pt's cardiologist Dr Lugo's office 2x                                       Yes    THER EX  (CPT Code 73374) TOTAL TIME FOR SESSION 8-22 Minutes Completed   STRETCHING     Stretching  Hamstrings seated with foot on 6\" block, B 1min x 2sets  DF slant board, " "slant board #3, 2min   Seated figure 4, 1min B  Piriformis B  LTR   Windshield wipers   Open book   Standing open book stretch  Yes   YES       Supine There-Ex Clamshells  Bridges  SLR  S/L hip abd    Seated Ther-Ex     Standing There-Ex Side stepping GTB around knees  - 2 laps in //bars    Heel raises from slant board #2    Split squat at 6\" blocks 10x R, 10x L    Side stepping up and down ramp B directions, with green theraband around tibias    Monster walk up and down ramp with green theraband    10x R backwards block 6\" step ups, 10x L         Yes    Elliptical 2min, hands on stationary bar the whole time, level 1.7    Nu Step (time/level/SPM) Seat #12, LE only, level #4 x6mins    Stationary Bike Level #5, seat 14, 8min, + SOB afterwards Yes    Treadmill Focus on narrowing YANA with B UE support 6 min    NEURO RE-ED  (CPT Code 77008) TOTAL TIME FOR SESSION 0-7 Minutes Completed   Balance Training Rockerboard: black full tread  - AP rocking  - static standing with HHT and VHT, modA    Airex:  - stepping on and off RLE lead 20x LLE lead 20x   - FA HHT, VHT, + sway occasional //bars needed  - FA EC - modA and //bars needed  - FA, trunk rotations blue theraband  - FA, scapular retraction pink theraband   - hank taps, 12\", occasional UE support and Huber  - semi tandem  - tandem  - FA, 4# dowel ball toss push backs  - feet closer together, golf swings with 2# dowel    Split Stance    Staggered stance on river rocks, practicing getting on and off    FA on river rocks with HHT and VHT     SLS block practice  - improved to 15 sec CGA    Tandem   - static  - fwd/bkw WS  - walking fwd/bkw    FA, EC, + sway, S    Gold swings; 3 iron and 9iron with different YANA, using 2# dowel (could not find golf clubs, not in bag)    Biodex     In // bars:   - tandem walking x4 laps without UE support   - staggered stance, holding ball, rotations 2x10 b/l   -  staggered stance, holding ball with rotations from foam 2x10 B/L   - staggered " "stance resisted trunk rotation with OTB 2x12 B/L   - staggered stance chop and lift 2x10                 Coordination 4 square (PVC piping); CW 8x and CCW 8x    Kianna: single step over, 10x R 10x L    6\" hurdles: forwards , laterally, alt LE lead, repeated     Agility ladder    Intermittent laps in between NMRE to focus on more narrow YANA and use of naboso inserts in    Heel walking holding 6# dowel overhead    Toe walking holding 6# dowel overhead    Backwards walking holding 6# dowel overhead    Forward and backwards 4# dowel ball toss taps                        Pre Gait Activities Block tappin\" block 20x without insert then 20x with insert, 10x 6\" block taps B heel only  Kianna taps  - no UE support    Kianna step over   - no UE support    Block taps 6\" forwards, 10x B, no UE support , laterally 10x B no UE support  Block taps 8\" laterally, 30x B, no UE support                  Yes    Postural Re-Education Back against wall, open books, PPT  Chin tucks  Rows  Palloff press    Tall kneel with chair    Half kneel with chair, R, PNF D2 without weight  Half kneel with chair, L, PNF D2 without weight            Outcome Assessments Mendoza      GAIT TRAINING  (CPT Code 07096) TOTAL TIME FOR SESSION Not performed  Completed   Ambulation No AD  - focus on narrowing YANA  - some scissoring in R LE, improved with cueing    Walking Assessments WBOS and unsteady  R LE scissoring when narrowed YANA  6MWT  Gait speed    Stair negotiation Reciprocal HR on R when ascending    Curb negotiation CGA R LE WB    Ramp negotiation CGA, felt unsteady in descending    Outdoor ambulation     MANUAL  (CPT Code 55236) TOTAL TIME FOR SESSION Not performed Completed   Stretching by therapist/PROM     Mobilization      MODALITIES   TOTAL TIME FOR SESSION Not performed Completed   Ice     Heat     ATTENDED E-STIM  (CPT Code 04404) TOTAL TIME FOR SESSION Not performed Completed   Attended E-Stim        "

## 2025-03-31 NOTE — PROGRESS NOTES
Physical Therapy Visit    PT DAILY NOTE FOR OUTPATIENT THERAPY    Patient: Roberth Amato MRN: [id number]  : 1950 74 y.o.  Referring Physician: Augusto Murray DO  Date of Visit: 3/31/2025    Certification Dates: 25 through 25     Diagnosis:   1. Unsteadiness on feet    2. Impaired functional mobility, balance, gait, and endurance        Chief Complaints:       Precautions:   Precautions comments: diabetic neuropathy      TODAY'S VISIT    Time In Session:      History/Vitals/Pain/Encounter Info - 25 1507          Injury History/Precautions/Daily Required Info    Document Type daily treatment     Primary Therapist Blake Alicia, PT     Onset of Illness/Injury or Date of Surgery 10/07/24     Referring Physician Dr. Murray     Precautions comments diabetic neuropathy     History of present illness/functional impairment Destin is a 73 yo M with a PMH of AAA, DM Type II, stage III kidney disease, CAD, s/p CABGx4 2021, neuropathy.s/p C2-7 PDF 10/7/24 at [facility], with DC on 10/11/24 due to cervical myelopathy. DC home without homecare. Since then he has been going to the gym. Pt continues to have some minor hand tingling complaints and continues to have his baseline diabetic neuropathy in feet. Eager to start PT.     Patient/Family/Caregiver Comments/Observations reports no new issues. wrote down BP today which was 112/40 and 116/46 today earlier at home. when PT asked if he called his cardiologist about the low DBP last week like he said he would - he said he did not     Patient reported fall since last visit No        Pain Assessment    Currently in pain No/Denies        Pre Activity Vital Signs    Pulse 68     /38        Activity Vital Signs    Activity /32        Post Activity Vital Signs    Post Activity /36                    Daily Treatment Assessment and Plan - 25 1507          Daily Treatment Assessment and Plan    Progress toward goals  "Progressing     Daily Outcome Summary Pt did not call his cardiologist like he said he would, 4 days ago, he said \"because the numbers were close to my normal i didn't feel like i needed to call\". Pt with low DBP today into the 30's. PT confirmed with supervisor that that # is too low to treat and do any exertion with. With 5 min rest and some water, he still did not have sufficient rise in pressure - 90/36. Cont with skilled OPPT services. Session ended short due to BP issues. When PT called Dr Lugo's office, they were not alarmed with BP as it was close to his 40's DBP and as he was just seen and when asked if he should go to the ED they said no and that he was OK to drive home. Nurse said that  he will get a call soon from them. Pt understands that he must get parameters to return for PT.     Plan and Recommendations golf mechanics, mat stretching. Floor transfers. ankle strengthening                          OBJECTIVE DATA TAKEN TODAY:    None taken    Today's Treatment:    PT Neuro Exercises Current Session Time Completed   THER ACT   (CPT Code 74500) TOTAL TIME FOR SESSION 23-37 Minutes    Monitored Vitals, Pain Monitored, asymptomatic but low DBP, checked frequently, rest breaks, increased hydration  Hx of varied feelings of lightheadedness Yes    Transfer training     HEP 2/14 given with good understanding of form and frequency  - SLS in corner for safety  - side stepping GTB around knees    HEP review with good understanding of form and frequency    3/3 HEP update with moving GTB to mid calf for increased resistance to help improve stability in stance, pt showed good understanding of form and frequency    3/3 review Hs and gastroc stretch for home stephania with good understanding of form and frequency                Orthotic assessment Naboso inserts(GABRIEL leija) trialed today in B shoes)    Patient Education POC, role of PT, safety, HEP semi tandem and how to progress with HHT and VHT    Pt education on the " "importance of practicing newly learned gait mechanics outside of therapy as well as block practice on TM as it relates to progression throughout therapy. Pt verbalized understanding and agreed    Neurologist appt yesterday with Dr Murray (R hand and leg EMG). Next week he has an MRI of the brain    Pt edu about more NBOS with ambulation. Edu about outcome measure comparison. Edu about when doing block taps at home to focus on the LE on the floor taking the weight and little to no weight going on the step, edu about skin checks for feet 2/2 naboso inserts - pt receptive to all education     Need for f/u with cardiologist, need for parameters for pt to return so as not to waste his time or have to send pt home next session. PT called pt's cardiologist Dr Lugo's office 2x                                       Yes    THER EX  (CPT Code 98579) TOTAL TIME FOR SESSION 8-22 Minutes Completed   STRETCHING     Stretching  Hamstrings seated with foot on 6\" block, B 1min x 2sets  DF slant board, slant board #3, 2min   Seated figure 4, 1min B  Piriformis B  LTR   Windshield wipers   Open book   Standing open book stretch  Yes   YES       Supine There-Ex Clamshells  Bridges  SLR  S/L hip abd    Seated Ther-Ex     Standing There-Ex Side stepping GTB around knees  - 2 laps in //bars    Heel raises from slant board #2    Split squat at 6\" blocks 10x R, 10x L    Side stepping up and down ramp B directions, with green theraband around tibias    Monster walk up and down ramp with green theraband    10x R backwards block 6\" step ups, 10x L         Yes    Elliptical 2min, hands on stationary bar the whole time, level 1.7    Nu Step (time/level/SPM) Seat #12, LE only, level #4 x6mins    Stationary Bike Level #5, seat 14, 8min, + SOB afterwards Yes    Treadmill Focus on narrowing YANA with B UE support 6 min    NEURO RE-ED  (CPT Code 71591) TOTAL TIME FOR SESSION 0-7 Minutes Completed   Balance Training Rockerboard: black full tread  - AP " "rocking  - static standing with HHT and VHT, modA    Airex:  - stepping on and off RLE lead 20x LLE lead 20x   - FA HHT, VHT, + sway occasional //bars needed  - FA EC - modA and //bars needed  - FA, trunk rotations blue theraband  - FA, scapular retraction pink theraband   - hank taps, 12\", occasional UE support and Huber  - semi tandem  - tandem  - FA, 4# dowel ball toss push backs  - feet closer together, golf swings with 2# dowel    Split Stance    Staggered stance on river rocks, practicing getting on and off    FA on river rocks with HHT and VHT     SLS block practice  - improved to 15 sec CGA    Tandem   - static  - fwd/bkw WS  - walking fwd/bkw    FA, EC, + sway, S    Gold swings; 3 iron and 9iron with different YANA, using 2# dowel (could not find golf clubs, not in bag)    Biodex     In // bars:   - tandem walking x4 laps without UE support   - staggered stance, holding ball, rotations 2x10 b/l   -  staggered stance, holding ball with rotations from foam 2x10 B/L   - staggered stance resisted trunk rotation with OTB 2x12 B/L   - staggered stance chop and lift 2x10                 Coordination 4 square (PVC piping); CW 8x and CCW 8x    Hank: single step over, 10x R 10x L    6\" hurdles: forwards , laterally, alt LE lead, repeated     Agility ladder    Intermittent laps in between NMRE to focus on more narrow YANA and use of naboso inserts in    Heel walking holding 6# dowel overhead    Toe walking holding 6# dowel overhead    Backwards walking holding 6# dowel overhead    Forward and backwards 4# dowel ball toss taps                        Pre Gait Activities Block tappin\" block 20x without insert then 20x with insert, 10x 6\" block taps B heel only  Hank taps  - no UE support    Hank step over   - no UE support    Block taps 6\" forwards, 10x B, no UE support , laterally 10x B no UE support  Block taps 8\" laterally, 30x B, no UE support                  Yes    Postural Re-Education Back against wall, " open books, PPT  Chin tucks  Rows  Palloff press    Tall kneel with chair    Half kneel with chair, R, PNF D2 without weight  Half kneel with chair, L, PNF D2 without weight            Outcome Assessments Mendoza      GAIT TRAINING  (CPT Code 84930) TOTAL TIME FOR SESSION Not performed  Completed   Ambulation No AD  - focus on narrowing YANA  - some scissoring in R LE, improved with cueing    Walking Assessments WBOS and unsteady  R LE scissoring when narrowed YANA  6MWT  Gait speed    Stair negotiation Reciprocal HR on R when ascending    Curb negotiation CGA R LE WB    Ramp negotiation CGA, felt unsteady in descending    Outdoor ambulation     MANUAL  (CPT Code 10554) TOTAL TIME FOR SESSION Not performed Completed   Stretching by therapist/PROM     Mobilization      MODALITIES   TOTAL TIME FOR SESSION Not performed Completed   Ice     Heat     ATTENDED E-STIM  (CPT Code 86534) TOTAL TIME FOR SESSION Not performed Completed   Attended E-Stim

## 2025-04-02 ENCOUNTER — TELEPHONE (OUTPATIENT)
Dept: PHYSICAL THERAPY | Facility: REHABILITATION | Age: 75
End: 2025-04-02
Payer: MEDICARE

## 2025-04-02 NOTE — TELEPHONE ENCOUNTER
PT called to check in with Destin about having to end his session in PT early on 3/31 due to low DBP as low as 32. PT had conversations with pt's cardiologist office Dr Lugo, however they were not concerned with pt's low BP. Today checking in with Pt to see if he was able to f/u with his cardiologist, pt reports that his one AM medication was changed to avoid drop in BP and they want pt to continue with PT. PT had asked pt to get exercise parameters on 3/31 after visit, however that was not attained, PT reiterated this to pt that this is needed to return to PT and he will call the office today 4/2 and if he gets the parameters PT reports he is able to return on 4/3, and if not, to cancel. Pt reports understanding.   Blake Alicia PT DPT

## 2025-04-09 ENCOUNTER — DOCUMENTATION (OUTPATIENT)
Dept: SOCIAL WORK | Facility: REHABILITATION | Age: 75
End: 2025-04-09
Payer: MEDICARE

## 2025-04-09 NOTE — PROGRESS NOTES
4/9/25: CM was asked to contact patient regarding his cardiology appointment. CM called and left a message. KASANDRA Castillo.

## (undated) DEVICE — BLADE BONE MILL DISP MEDIUM

## (undated) DEVICE — MANIFOLD FOUR PORT NEPTUNE

## (undated) DEVICE — EVACUATOR CLOSED SUCTION 100C

## (undated) DEVICE — STRAP POSITIONING 5X72" ONE PIECE DISP

## (undated) DEVICE — TIP BOVIE EXTENSION REUSABLE 4IN

## (undated) DEVICE — DRAIN SINGLE ROUND W/TROCAR 3/16

## (undated) DEVICE — SUTURE MONOSOF 2-0 BLACK 1X18 C-15

## (undated) DEVICE — *T* 3.0MM MATCH HEAD PRECISION NEURO BUR

## (undated) DEVICE — PENEVAC1 NONSTICK SMOKE EVAC

## (undated) DEVICE — PACK SPINE LUMBAR

## (undated) DEVICE — GLOVE PROTEXIS PI ORTHO 7.5

## (undated) DEVICE — SUTURE POLYSORB 1 UNDYED 1X30 GS-12

## (undated) DEVICE — Device

## (undated) DEVICE — PACK SET UP NO DRAPE 12/CS

## (undated) DEVICE — *T* 1.7MM MATCH HEAD PRECISION NEURO BUR

## (undated) DEVICE — SYSTEM LABELING CORRECT MEDICATION

## (undated) DEVICE — TAP 3.0MM

## (undated) DEVICE — BIT DRILL12MM

## (undated) DEVICE — ELECTROSURGICAL TIP CLEANER

## (undated) DEVICE — POUCH INSTRUMENT 7X11 3-4

## (undated) DEVICE — DRESSING SPONGE ALL GAUZE 4X4 STER 10PK

## (undated) DEVICE — TAPE ADHESIVE 3IN

## (undated) DEVICE — GAUZE XEROFORM 1X8 NON-STERILE PACKET

## (undated) DEVICE — TAPE MICROFOAM  3IN 4 RL-BX

## (undated) DEVICE — GLOVE SZ 7.5 LINER PROTEXIS PI BL

## (undated) DEVICE — SUTURE MONOSOF 3-0 BLACK 1X30 C-16

## (undated) DEVICE — PAD GROUND ELECTROSURGICAL W/CORD

## (undated) DEVICE — SEALANT SURGICAL FLOSEAL 10ML STERILE PREP

## (undated) DEVICE — COVER MAYO STAND XLARGE 30¿ X 57¿

## (undated) DEVICE — SOLN DURAPREP WAND

## (undated) DEVICE — FORCEP BAYONET SLM 23CM X 1.5MM DISP

## (undated) DEVICE — HANDLE LIGHT COVER STERILE

## (undated) DEVICE — PINS SKULL DISPOSABLE

## (undated) DEVICE — DRAPE ABDOMINAL MAJOR ST 8/CS

## (undated) DEVICE — TRAY URINE METER FOLEY NON-SILVER

## (undated) DEVICE — SUTURE POLYSORB 2-0 UNDYED 1X30 GS-10

## (undated) DEVICE — GOWN SIRUS NONRNF RAGLAN XL ST 30/CS